# Patient Record
Sex: FEMALE | Race: WHITE | NOT HISPANIC OR LATINO | Employment: FULL TIME | URBAN - NONMETROPOLITAN AREA
[De-identification: names, ages, dates, MRNs, and addresses within clinical notes are randomized per-mention and may not be internally consistent; named-entity substitution may affect disease eponyms.]

---

## 2017-09-15 ENCOUNTER — TELEPHONE (OUTPATIENT)
Dept: SURGERY | Facility: CLINIC | Age: 22
End: 2017-09-15

## 2017-09-15 NOTE — TELEPHONE ENCOUNTER
Patient no showed for her appointment . No phone # listed to contact patient. Called PCP office spoke with Deepa told her patient no showed for appointment and asks for another phone # to contact patient the only one she had was the same number we had called and when called whoever answered said he didn't know a Janae. I mailed a no show letter to patient.

## 2017-10-03 ENCOUNTER — OFFICE VISIT (OUTPATIENT)
Dept: SURGERY | Facility: CLINIC | Age: 22
End: 2017-10-03

## 2017-10-03 VITALS
HEIGHT: 60 IN | TEMPERATURE: 98.8 F | SYSTOLIC BLOOD PRESSURE: 108 MMHG | OXYGEN SATURATION: 97 % | BODY MASS INDEX: 28.62 KG/M2 | HEART RATE: 97 BPM | DIASTOLIC BLOOD PRESSURE: 68 MMHG | WEIGHT: 145.8 LBS

## 2017-10-03 DIAGNOSIS — K62.5 RECTAL BLEED: Primary | ICD-10-CM

## 2017-10-03 PROCEDURE — 99204 OFFICE O/P NEW MOD 45 MIN: CPT | Performed by: SURGERY

## 2017-10-03 RX ORDER — AMITRIPTYLINE HYDROCHLORIDE 25 MG/1
25 TABLET, FILM COATED ORAL NIGHTLY
COMMUNITY
End: 2017-11-07

## 2017-10-03 RX ORDER — FLAVOXATE HYDROCHLORIDE 100 MG/1
100 TABLET ORAL 3 TIMES DAILY PRN
COMMUNITY
End: 2017-11-07

## 2017-10-03 NOTE — PROGRESS NOTES
Patient: Janae Padilla    YOB: 1995    Date: 10/03/2017    Primary Care Provider: Tunde Valles MD    Chief complaint:   Chief Complaint   Patient presents with   • Rectal Bleeding       Subjective .     History of present illness:  I saw the patient in office today for rectal bleeding that she says has been going on for two years but has gotten worse in the past few months. She states it started out as spotting but is now more. She had a colonoscopy done in 2015 at [facility] that showed diverticulosis and three hemorrhoids. She complains of having diarrhea in the past couple of daysBut usually is more constipated. She has been having cramp like lower abdominal pain that does not radiate.  The bleeding is usually with wiping but sometimes on the surface of the stool.  She denies any nausea or vomiting.  Patient also admits to some mucousy stools.  No weight loss.    Review of Systems   Constitutional: Negative for chills, fever and unexpected weight change.   HENT: Negative for hearing loss, trouble swallowing and voice change.    Eyes: Negative for visual disturbance.   Respiratory: Negative for apnea, cough, chest tightness, shortness of breath and wheezing.    Cardiovascular: Negative for chest pain, palpitations and leg swelling.   Gastrointestinal: Positive for abdominal pain, anal bleeding and diarrhea. Negative for abdominal distention, blood in stool, constipation, nausea, rectal pain and vomiting.   Endocrine: Negative for cold intolerance and heat intolerance.   Genitourinary: Negative for difficulty urinating, dysuria and flank pain.   Musculoskeletal: Negative for back pain and gait problem.   Skin: Negative for color change, rash and wound.   Neurological: Negative for dizziness, syncope, speech difficulty, weakness, light-headedness, numbness and headaches.   Hematological: Negative for adenopathy. Does not bruise/bleed easily.   Psychiatric/Behavioral: Negative for  "confusion. The patient is not nervous/anxious.        Allergies:  Allergies   Allergen Reactions   • Bactrim [Sulfamethoxazole-Trimethoprim]        Medications:    Current Outpatient Prescriptions:   •  amitriptyline (ELAVIL) 25 MG tablet, Take 25 mg by mouth Every Night., Disp: , Rfl:   •  flavoxATE (URISPAS) 100 MG tablet, Take 100 mg by mouth 3 (Three) Times a Day As Needed for bladder spasms., Disp: , Rfl:     History\"  History reviewed. No pertinent past medical history.    Past Surgical History:   Procedure Laterality Date   • BILATERAL BREAST REDUCTION      2011   • COLONOSCOPY      2015   • WISDOM TOOTH EXTRACTION      2014       Family History   Problem Relation Age of Onset   • No Known Problems Mother    • No Known Problems Father        Social History   Substance Use Topics   • Smoking status: Never Smoker   • Smokeless tobacco: Never Used   • Alcohol use No        Objective     Vital Signs:   /68  Pulse 97  Temp 98.8 °F (37.1 °C) (Temporal Artery )   Ht 60\" (152.4 cm)  Wt 145 lb 12.8 oz (66.1 kg)  SpO2 97%  BMI 28.47 kg/m2    Physical Exam:   General Appearance:    Alert, cooperative, in no acute distress   Head:    Normocephalic, without obvious abnormality, atraumatic   Eyes:            Lids and lashes normal, conjunctivae and sclerae normal, no   icterus, no pallor, corneas clear, PERRLA   Ears:    Ears appear intact with no abnormalities noted   Lungs:     Clear to auscultation,respirations regular, even and                  unlabored    Heart:    Regular rhythm and normal rate, normal S1 and S2, no            murmur, no gallop, no rub, no click   Chest Wall:    No abnormalities observed   Abdomen:     Normal bowel sounds, no masses, no organomegaly, soft        non-tender, non-distended, no guarding, no rebound                tenderness   Extremities:   Moves all extremities well, no edema, no cyanosis, no             redness   Skin:   No bleeding, bruising or rash   Neurologic:   " Cranial nerves 2 - 12 grossly intact, sensation intact,    Anal exam: Digital exam and anoscopy were unremarkable.  No significant internal hemorrhoids were noted.  No fissure or other abnormality seen.       Results Review:   I reviewed the patient's new clinical results.  I retrieved the old colonoscopy report from [place] and reviewed this    Assessment/Plan     1. Rectal bleed        Still most likely hemorrhoidal bleeding given negative colonoscopy other than for an isolated diverticula at that time.  I would like to treat her relative constipation with fiber daily such as Metamucil.  Local hygiene was also explained.  Bowel habits including avoiding sitting for prolonged period of time and straining etc.  Diet and exercise as well as plenty of liquids were explained as well.  I will see her in a month to see how she is progressing.  If she continues to have issues we may proceed with a colonoscopy but this will likely be of low yield given a normal colonoscopy otherwise to years ago and no weight loss or significant diarrhea etc.    Review of Systems was reviewed and confirmed as accurate today.    Electronically signed by Amado Garcia MD  10/03/17      Scribed for Amado Garcia MD by Kizzy Zepeda. 10/3/2017  10:02 AM

## 2017-11-07 ENCOUNTER — OFFICE VISIT (OUTPATIENT)
Dept: SURGERY | Facility: CLINIC | Age: 22
End: 2017-11-07

## 2017-11-07 VITALS
WEIGHT: 150 LBS | SYSTOLIC BLOOD PRESSURE: 102 MMHG | DIASTOLIC BLOOD PRESSURE: 66 MMHG | RESPIRATION RATE: 16 BRPM | TEMPERATURE: 98.4 F | HEIGHT: 60 IN | HEART RATE: 86 BPM | OXYGEN SATURATION: 100 % | BODY MASS INDEX: 29.45 KG/M2

## 2017-11-07 DIAGNOSIS — K64.0 GRADE I HEMORRHOIDS: Primary | ICD-10-CM

## 2017-11-07 PROCEDURE — 99213 OFFICE O/P EST LOW 20 MIN: CPT | Performed by: SURGERY

## 2017-11-07 RX ORDER — ETONOGESTREL AND ETHINYL ESTRADIOL 11.7; 2.7 MG/1; MG/1
1 INSERT, EXTENDED RELEASE VAGINAL
COMMUNITY
End: 2017-11-07

## 2017-11-07 NOTE — PROGRESS NOTES
"Patient: Janae Padilla    YOB: 1995    Date: 11/07/2017    Primary Care Provider: Tunde Valles MD    Chief Complaint:   Chief Complaint   Patient presents with   • Follow-up     rectal bleeding.       History: Pt is here for follow-up rectal bleeding, she stated that she is not having rectal bleeding at this time.Denies any diarrhea.  Pt stated that she had one episode of rectal pain which only lasted for a brief time, she is not having any rectal pain at this time  She did have a colonoscopy performed previously @ 2015 which was done in [place], [place], it showed diverticulosis and hemorrhoids.     Review of Systems   Constitutional: Negative for chills, fever and unexpected weight change.   Respiratory: Negative for apnea, cough, chest tightness, shortness of breath and wheezing.    Cardiovascular: Negative for chest pain, palpitations and leg swelling.   Gastrointestinal: Negative for abdominal distention, abdominal pain, anal bleeding, blood in stool, constipation, diarrhea, nausea, rectal pain and vomiting.   Genitourinary: Negative for difficulty urinating, dysuria and flank pain.   Skin: Negative for color change, rash and wound.       Vital Signs  /66  Pulse 86  Temp 98.4 °F (36.9 °C) (Temporal Artery )   Resp 16  Ht 60\" (152.4 cm)  Wt 150 lb (68 kg)  SpO2 100%  BMI 29.29 kg/m2    Allergies:  Allergies   Allergen Reactions   • Bactrim [Sulfamethoxazole-Trimethoprim]        Medications:    Current Outpatient Prescriptions:   •  amitriptyline (ELAVIL) 25 MG tablet, Take 25 mg by mouth Every Night., Disp: , Rfl:   •  etonogestrel-ethinyl estradiol (NUVARING) 0.12-0.015 MG/24HR vaginal ring, Insert 1 each into the vagina Every 28 (Twenty-Eight) Days. Insert vaginally and leave in place for 3 consecutive weeks, then remove for 1 week., Disp: , Rfl:   •  flavoxATE (URISPAS) 100 MG tablet, Take 100 mg by mouth 3 (Three) Times a Day As Needed for bladder spasms., Disp: , Rfl: "     Physical Exam:   General Appearance:    Alert, cooperative, in no acute distress    Heart:    Regular rhythm and normal rate,   Abdomen:     Normal bowel sounds, no masses, no organomegaly, soft        non-tender, non-distended, no guarding, no rebound                tenderness      Assessment/Plan     1. Grade I hemorrhoids :Bleeding likely secondary to hemorrhoids.  She has changed her diet and is more high-fiber nature.  This has improved her symptoms and she was performing a lot of wiping with her bowel movements before changing her diet which likely aggravated any of her symptoms.  If she has further bleeding she should follow-up with me otherwise continue high-fiber diet.         Electronically signed by Amado Garcia MD  11/07/17    Scribed for Amado Garcia MD by Tabatha Biswas. 11/7/2017  10:35 AM

## 2017-11-08 ENCOUNTER — OFFICE VISIT (OUTPATIENT)
Dept: UROLOGY | Facility: CLINIC | Age: 22
End: 2017-11-08

## 2017-11-08 VITALS
BODY MASS INDEX: 29.45 KG/M2 | OXYGEN SATURATION: 95 % | HEART RATE: 74 BPM | HEIGHT: 60 IN | TEMPERATURE: 95 F | WEIGHT: 150 LBS

## 2017-11-08 DIAGNOSIS — N30.10 IC (INTERSTITIAL CYSTITIS): Primary | ICD-10-CM

## 2017-11-08 LAB
BILIRUB BLD-MCNC: NEGATIVE MG/DL
CLARITY, POC: CLEAR
COLOR UR: YELLOW
GLUCOSE UR STRIP-MCNC: NEGATIVE MG/DL
KETONES UR QL: NEGATIVE
LEUKOCYTE EST, POC: NEGATIVE
NITRITE UR-MCNC: NEGATIVE MG/ML
PH UR: 5.5 [PH] (ref 5–8)
PROT UR STRIP-MCNC: NEGATIVE MG/DL
RBC # UR STRIP: ABNORMAL /UL
SP GR UR: 1.03 (ref 1–1.03)
UROBILINOGEN UR QL: NORMAL

## 2017-11-08 PROCEDURE — 52000 CYSTOURETHROSCOPY: CPT | Performed by: UROLOGY

## 2017-11-08 PROCEDURE — 99203 OFFICE O/P NEW LOW 30 MIN: CPT | Performed by: UROLOGY

## 2017-11-08 PROCEDURE — 81003 URINALYSIS AUTO W/O SCOPE: CPT | Performed by: UROLOGY

## 2017-11-08 PROCEDURE — 51700 IRRIGATION OF BLADDER: CPT | Performed by: UROLOGY

## 2017-11-08 RX ORDER — AMITRIPTYLINE HYDROCHLORIDE 25 MG/1
25 TABLET, FILM COATED ORAL NIGHTLY
Qty: 30 TABLET | Refills: 11 | Status: SHIPPED | OUTPATIENT
Start: 2017-11-08 | End: 2019-01-03 | Stop reason: SDUPTHER

## 2017-11-08 RX ORDER — AMITRIPTYLINE HYDROCHLORIDE 25 MG/1
25 TABLET, FILM COATED ORAL NIGHTLY
COMMUNITY
End: 2017-11-08 | Stop reason: SDUPTHER

## 2017-11-08 NOTE — PROGRESS NOTES
Chief Complaint  Consult (REFERRED BY [facility] FOR IC EVALUATION )      LUANA  Janae Padilla is a 22 y.o.female who presents today requesting evaluation for possible interstitial cystitis.  She has dramatic symptoms of overactive bladder pelvic pain and nocturia and her on has IC and told her it sounded familiar.  Ironically she was seen at  because of a strong family history of vesicoureteral reflux.  She was told she didn't have that but they did not check her for IC.  She has derived significant benefit of her nocturia from taking nightly Elavil.  She states a gynecologist performed a potassium stimulation test that was positive.  She's never had evaluation by urology with cystoscopy.    Vitals:    11/08/17 1542   Pulse: 74   Temp: 95 °F (35 °C)   SpO2: 95%       Past Medical History  Past Medical History:   Diagnosis Date   • Diverticulitis    • Hepatitis    • UTI (urinary tract infection)        Past Surgical History  Past Surgical History:   Procedure Laterality Date   • BILATERAL BREAST REDUCTION     • COLONOSCOPY         Medications  has a current medication list which includes the following prescription(s): amitriptyline.    Allergies  Allergies   Allergen Reactions   • Sulfa Antibiotics        Social History  Social History     Social History   • Marital status: Single     Spouse name: N/A   • Number of children: N/A   • Years of education: N/A     Occupational History   • Not on file.     Social History Main Topics   • Smoking status: Former Smoker   • Smokeless tobacco: Never Used   • Alcohol use No   • Drug use: No   • Sexual activity: Defer     Other Topics Concern   • Not on file     Social History Narrative   • No narrative on file       Family History  No family history on file.    Review of Systems  Review of Systems  Positive for chills feeling tired abdominal pain frequency vaginal discharge painful menstruation but negative and other categories.  Physical Exam  Physical Exam    Constitutional: She is oriented to person, place, and time. She appears well-developed and well-nourished.   HENT:   Head: Normocephalic.   Eyes: EOM are normal. Pupils are equal, round, and reactive to light.   Neck: Normal range of motion. Neck supple.   Cardiovascular: Normal rate and regular rhythm.    Pulmonary/Chest: Effort normal.   Abdominal: Soft. Bowel sounds are normal.   Genitourinary: Vagina normal and uterus normal.   Musculoskeletal: Normal range of motion. She exhibits no edema.   Neurological: She is alert and oriented to person, place, and time.   Skin: Skin is warm and dry.   Psychiatric: She has a normal mood and affect. Her behavior is normal.       Labs recent and today in the office:  Results for orders placed or performed in visit on 11/08/17   POC Urinalysis Dipstick, Automated   Result Value Ref Range    Color Yellow Yellow, Straw, Dark Yellow, Henna    Clarity, UA Clear Clear    Glucose, UA Negative Negative, 1000 mg/dL (3+) mg/dL    Bilirubin Negative Negative    Ketones, UA Negative Negative    Specific Gravity  1.030 1.005 - 1.030    Blood, UA Trace (A) Negative    pH, Urine 5.5 5.0 - 8.0    Protein, POC Negative Negative mg/dL    Urobilinogen, UA Normal Normal    Leukocytes Negative Negative    Nitrite, UA Negative Negative      Female cystoscopy:     The patient is prepped and draped in the dorsal lithotomy position in a routine sterile fashion. The vulva and urethral meatus are inspected and found to be normal. The panendoscope is inserted in the bladder which is visualized with the Foroblique and 70 degree lenses and found to be free of foreign bodies and mucosal lesions. Ureteral orifices are normal location and effluxing clear urine bilaterally.  She has a 1 ounce postvoid residual.  The bladder is distended only 300 mL which produces dramatic discomfort and on reinspection there are profound and dramatic changes of chronic interstitial cystitis.  She is given and installation  of rescue solution followed by DMSO.    Assessment & Plan  Placed on Toviaz Elavil is renewed and she will return in 1 month for follow-up.

## 2017-11-09 DIAGNOSIS — N30.10 IC (INTERSTITIAL CYSTITIS): Primary | ICD-10-CM

## 2017-11-09 RX ORDER — TRAMADOL HYDROCHLORIDE 50 MG/1
50 TABLET ORAL EVERY 6 HOURS PRN
Qty: 10 TABLET | Refills: 0 | Status: SHIPPED | OUTPATIENT
Start: 2017-11-09 | End: 2018-10-16

## 2017-12-14 ENCOUNTER — OFFICE VISIT (OUTPATIENT)
Dept: OBSTETRICS AND GYNECOLOGY | Facility: CLINIC | Age: 22
End: 2017-12-14

## 2017-12-14 ENCOUNTER — OFFICE VISIT (OUTPATIENT)
Dept: UROLOGY | Facility: CLINIC | Age: 22
End: 2017-12-14

## 2017-12-14 VITALS — BODY MASS INDEX: 29.06 KG/M2 | HEART RATE: 72 BPM | OXYGEN SATURATION: 98 % | WEIGHT: 148 LBS | HEIGHT: 60 IN

## 2017-12-14 VITALS
SYSTOLIC BLOOD PRESSURE: 110 MMHG | WEIGHT: 148 LBS | BODY MASS INDEX: 29.06 KG/M2 | HEIGHT: 60 IN | DIASTOLIC BLOOD PRESSURE: 62 MMHG

## 2017-12-14 DIAGNOSIS — N76.0 ACUTE VAGINITIS: Primary | ICD-10-CM

## 2017-12-14 DIAGNOSIS — N30.10 IC (INTERSTITIAL CYSTITIS): Primary | ICD-10-CM

## 2017-12-14 DIAGNOSIS — N89.8 VAGINAL DISCHARGE: ICD-10-CM

## 2017-12-14 LAB
BILIRUB BLD-MCNC: NEGATIVE MG/DL
CLARITY, POC: CLEAR
COLOR UR: YELLOW
GLUCOSE UR STRIP-MCNC: NEGATIVE MG/DL
KETONES UR QL: NEGATIVE
LEUKOCYTE EST, POC: NEGATIVE
NITRITE UR-MCNC: NEGATIVE MG/ML
PH UR: 7.5 [PH] (ref 5–8)
PROT UR STRIP-MCNC: NEGATIVE MG/DL
RBC # UR STRIP: ABNORMAL /UL
SP GR UR: 1.02 (ref 1–1.03)
UROBILINOGEN UR QL: NORMAL

## 2017-12-14 PROCEDURE — 99213 OFFICE O/P EST LOW 20 MIN: CPT | Performed by: UROLOGY

## 2017-12-14 PROCEDURE — 81003 URINALYSIS AUTO W/O SCOPE: CPT | Performed by: UROLOGY

## 2017-12-14 PROCEDURE — 99214 OFFICE O/P EST MOD 30 MIN: CPT | Performed by: PHYSICIAN ASSISTANT

## 2017-12-14 RX ORDER — FLUCONAZOLE 150 MG/1
TABLET ORAL
Qty: 2 TABLET | Refills: 0 | Status: SHIPPED | OUTPATIENT
Start: 2017-12-14 | End: 2018-05-10

## 2017-12-14 NOTE — PROGRESS NOTES
Chief Complaint  IC (INTERSTITIAL CYSTITIS) (PATIENT IS HERE FOR A 1 MONTH FOLLOW UP)      LUANA Padilla is a 22 y.o.female who was recently seen and underwent hydraulic dilatation with cystoscopy and was found have chronic interstitial cystitis.  Started on Elavil and Toviaz she returns today complaining of dry mouth but otherwise improved.  Her voided urine today is clear.    Vitals:    12/14/17 1458   Pulse: 72   SpO2: 98%       Past Medical History  Past Medical History:   Diagnosis Date   • Diverticulitis    • Hepatitis    • Intestinal disease    • UTI (urinary tract infection)        Past Surgical History  Past Surgical History:   Procedure Laterality Date   • BILATERAL BREAST REDUCTION     • COLONOSCOPY     • WISDOM TOOTH EXTRACTION  2014       Medications  has a current medication list which includes the following prescription(s): amitriptyline, fluconazole, and tramadol.    Allergies  Allergies   Allergen Reactions   • Bactrim [Sulfamethoxazole-Trimethoprim]    • Sulfa Antibiotics        Social History  Social History     Social History   • Marital status: Single     Spouse name: N/A   • Number of children: N/A   • Years of education: N/A     Occupational History   • Not on file.     Social History Main Topics   • Smoking status: Former Smoker   • Smokeless tobacco: Never Used   • Alcohol use No   • Drug use: Yes     Special: Marijuana   • Sexual activity: Yes     Birth control/ protection: Inserts      Comment: Nuva Ring     Other Topics Concern   • Not on file     Social History Narrative       Family History  History reviewed. No pertinent family history.    Review of Systems  Review of Systems    Physical Exam  Physical Exam   Constitutional: She is oriented to person, place, and time. She appears well-developed and well-nourished.   HENT:   Head: Normocephalic.   Eyes: EOM are normal. Pupils are equal, round, and reactive to light.   Neck: Normal range of motion. Neck supple.   Cardiovascular:  Normal rate, regular rhythm and normal heart sounds.    Pulmonary/Chest: Effort normal.   Abdominal: Soft. Bowel sounds are normal.   Musculoskeletal: Normal range of motion.   Neurological: She is alert and oriented to person, place, and time.   Skin: Skin is warm and dry.   Psychiatric: She has a normal mood and affect.       Labs recent and today in the office:  Results for orders placed or performed in visit on 12/14/17   POC Urinalysis Dipstick, Automated   Result Value Ref Range    Color Yellow Yellow, Straw, Dark Yellow, Henna    Clarity, UA Clear Clear    Glucose, UA Negative Negative, 1000 mg/dL (3+) mg/dL    Bilirubin Negative Negative    Ketones, UA Negative Negative    Specific Gravity  1.020 1.005 - 1.030    Blood, UA 2+ (A) Negative    pH, Urine 7.5 5.0 - 8.0    Protein, POC Negative Negative mg/dL    Urobilinogen, UA Normal Normal    Leukocytes Negative Negative    Nitrite, UA Negative Negative         Assessment & Plan  Chronic interstitial cystitis: She is encouraged bring by specimen anytime she thinks she has an infection and consider additional treatments with DMSO.  I suggested Myrbetriq since she is having a hard time tolerating the Toviaz.  She'll return to clinic for follow-up.

## 2017-12-14 NOTE — PROGRESS NOTES
Subjective   Chief Complaint   Patient presents with   • Vaginal Discharge     Pt states that she has a recurrent problem with Vaginal Discharge (2+ years) Discharge white, thick , odor.  No itching or burning.        Janae Padilla is a 22 y.o. year old  presenting to be seen for complaint of vaginal discharge and itching  Patient reports whitish vaginal discharge which she has had for a few years but noted itching and some odor for 2-3 weeks  She has not been sexually active for 2 months and did not use condoms consistently  Has been using Nuva ring for 3 months-was on DMPA before Nuva ring  She has interstitial cystitis followed by Dr Kennedy           Past Medical History:   Diagnosis Date   • Diverticulitis    • Hepatitis    • Intestinal disease    • UTI (urinary tract infection)         Current Outpatient Prescriptions:   •  amitriptyline (ELAVIL) 25 MG tablet, Take 1 tablet by mouth Every Night., Disp: 30 tablet, Rfl: 11  •  fluconazole (DIFLUCAN) 150 MG tablet, One po now and repeat in 3 days, Disp: 2 tablet, Rfl: 0  •  traMADol (ULTRAM) 50 MG tablet, Take 1 tablet by mouth Every 6 (Six) Hours As Needed for Severe Pain ., Disp: 10 tablet, Rfl: 0   Allergies   Allergen Reactions   • Bactrim [Sulfamethoxazole-Trimethoprim]    • Sulfa Antibiotics       Past Surgical History:   Procedure Laterality Date   • BILATERAL BREAST REDUCTION     • COLONOSCOPY     • WISDOM TOOTH EXTRACTION        Social History     Social History   • Marital status: Single     Spouse name: N/A   • Number of children: N/A   • Years of education: N/A     Occupational History   • Not on file.     Social History Main Topics   • Smoking status: Former Smoker   • Smokeless tobacco: Never Used   • Alcohol use No   • Drug use: Yes     Special: Marijuana   • Sexual activity: Yes     Birth control/ protection: Inserts      Comment: Nuva Ring     Other Topics Concern   • Not on file     Social History Narrative      History reviewed. No  "pertinent family history.    Review of Systems   Constitutional: Negative.    Gastrointestinal: Negative.    Endocrine: Positive for heat intolerance and polydipsia.   Genitourinary: Positive for difficulty urinating, enuresis, frequency and urgency.           Objective   /62  Ht 152.4 cm (60\")  Wt 67.1 kg (148 lb)  LMP 04/01/2017  BMI 28.9 kg/m2    Physical Exam   Constitutional: She appears well-developed and well-nourished. She is cooperative.   Abdominal: Soft. Normal appearance. There is no hepatosplenomegaly. There is no tenderness. There is no rigidity and no guarding.   Genitourinary: Uterus normal. There is no tenderness or lesion on the right labia. There is no tenderness or lesion on the left labia. Cervix exhibits discharge. Cervix exhibits no motion tenderness and no friability. Right adnexum displays no mass and no tenderness. Left adnexum displays no mass and no tenderness. There is erythema in the vagina. Vaginal discharge found.   Neurological: She is alert.   Skin: Skin is warm and dry.   Psychiatric: She has a normal mood and affect. Her behavior is normal.            Assessment and Plan  Janae was seen today for vaginal discharge.    Diagnoses and all orders for this visit:    Acute vaginitis  -     NuSwab VG+ - Swab, Vagina    Vaginal discharge    Other orders  -     fluconazole (DIFLUCAN) 150 MG tablet; One po now and repeat in 3 days      Patient Instructions   Clinically appears yeast so will treat with diflucan  Vaginal swab done-call with results             This note was electronically signed.    Ramona Hernandez PA-C   December 14, 2017  "

## 2017-12-20 LAB
A VAGINAE DNA VAG QL NAA+PROBE: ABNORMAL SCORE
BVAB2 DNA VAG QL NAA+PROBE: ABNORMAL SCORE
C ALBICANS DNA VAG QL NAA+PROBE: NEGATIVE
C GLABRATA DNA VAG QL NAA+PROBE: NEGATIVE
C TRACH RRNA SPEC QL NAA+PROBE: NEGATIVE
MEGA1 DNA VAG QL NAA+PROBE: ABNORMAL SCORE
N GONORRHOEA RRNA SPEC QL NAA+PROBE: NEGATIVE
T VAGINALIS RRNA SPEC QL NAA+PROBE: NEGATIVE

## 2017-12-22 ENCOUNTER — TELEPHONE (OUTPATIENT)
Dept: OBSTETRICS AND GYNECOLOGY | Facility: CLINIC | Age: 22
End: 2017-12-22

## 2017-12-22 RX ORDER — METRONIDAZOLE 7.5 MG/G
GEL VAGINAL
Qty: 70 G | Refills: 0 | Status: SHIPPED | OUTPATIENT
Start: 2017-12-22 | End: 2018-05-10

## 2017-12-22 NOTE — TELEPHONE ENCOUNTER
----- Message from Ramona Hernandez PA-C sent at 12/21/2017  8:16 AM EST -----  Please inform swab positive for BV and send in metrogel vaginal one applicator at  for 7 nights-thanks

## 2018-01-24 ENCOUNTER — TELEPHONE (OUTPATIENT)
Dept: UROLOGY | Facility: CLINIC | Age: 23
End: 2018-01-24

## 2018-01-24 ENCOUNTER — LAB (OUTPATIENT)
Dept: UROLOGY | Facility: CLINIC | Age: 23
End: 2018-01-24

## 2018-01-24 DIAGNOSIS — N30.10 CYSTITIS, INTERSTITIAL: ICD-10-CM

## 2018-01-24 DIAGNOSIS — R30.0 DYSURIA: Primary | ICD-10-CM

## 2018-01-24 LAB
BILIRUB BLD-MCNC: NEGATIVE MG/DL
CLARITY, POC: CLEAR
COLOR UR: YELLOW
GLUCOSE UR STRIP-MCNC: NEGATIVE MG/DL
KETONES UR QL: NEGATIVE
LEUKOCYTE EST, POC: ABNORMAL
NITRITE UR-MCNC: POSITIVE MG/ML
PH UR: 5.5 [PH] (ref 5–8)
PROT UR STRIP-MCNC: NEGATIVE MG/DL
RBC # UR STRIP: ABNORMAL /UL
SP GR UR: 1.03 (ref 1–1.03)
UROBILINOGEN UR QL: NORMAL

## 2018-01-24 PROCEDURE — 81003 URINALYSIS AUTO W/O SCOPE: CPT | Performed by: UROLOGY

## 2018-01-24 NOTE — TELEPHONE ENCOUNTER
Patient came in for a urine, Patient states she cannot tell if it is a UTI or her IC. Urine test was very positive for infection. Culture is sent, Patient states she would like a different antibiotic than the one prescribed last time she had a UTI, stating it really didn't seem to help. Patient uses [facility] in [place].  UA was positive for blood, leukocytes, and nitrites.

## 2018-01-24 NOTE — TELEPHONE ENCOUNTER
Patient states she would like to have an antibiotic that does not cause a yeast infection, she is allergic to sulfa.

## 2018-01-24 NOTE — TELEPHONE ENCOUNTER
I called patient to let her know that I spoke with  and we are waiting for her urine culture results in order to best treat her with antibiotics that will work well.

## 2018-01-26 LAB
BACTERIA UR CULT: NO GROWTH
BACTERIA UR CULT: NORMAL

## 2018-02-21 ENCOUNTER — PROCEDURE VISIT (OUTPATIENT)
Dept: UROLOGY | Facility: CLINIC | Age: 23
End: 2018-02-21

## 2018-02-21 DIAGNOSIS — N30.10 CYSTITIS, INTERSTITIAL: Primary | ICD-10-CM

## 2018-02-21 LAB
BILIRUB BLD-MCNC: NEGATIVE MG/DL
CLARITY, POC: CLEAR
COLOR UR: YELLOW
GLUCOSE UR STRIP-MCNC: NEGATIVE MG/DL
KETONES UR QL: NEGATIVE
LEUKOCYTE EST, POC: NEGATIVE
NITRITE UR-MCNC: NEGATIVE MG/ML
PH UR: 6 [PH] (ref 5–8)
PROT UR STRIP-MCNC: NEGATIVE MG/DL
RBC # UR STRIP: ABNORMAL /UL
SP GR UR: 1.02 (ref 1–1.03)
UROBILINOGEN UR QL: NORMAL

## 2018-02-21 PROCEDURE — 81003 URINALYSIS AUTO W/O SCOPE: CPT | Performed by: UROLOGY

## 2018-02-21 PROCEDURE — 51700 IRRIGATION OF BLADDER: CPT | Performed by: UROLOGY

## 2018-02-21 NOTE — PROGRESS NOTES
Patient came in for Rimso treatment only, patient tolerated the procedure well and was covered with a cipro.  Cipro 500mg   Lot [id number]  Exp 08/19  ndc [id number]

## 2018-03-29 ENCOUNTER — PROCEDURE VISIT (OUTPATIENT)
Dept: UROLOGY | Facility: CLINIC | Age: 23
End: 2018-03-29

## 2018-03-29 VITALS
HEART RATE: 66 BPM | DIASTOLIC BLOOD PRESSURE: 69 MMHG | SYSTOLIC BLOOD PRESSURE: 138 MMHG | TEMPERATURE: 98.3 F | OXYGEN SATURATION: 99 % | RESPIRATION RATE: 18 BRPM

## 2018-03-29 DIAGNOSIS — N30.40 IRRADIATION CYSTITIS: Primary | ICD-10-CM

## 2018-03-29 DIAGNOSIS — N30.10 CYSTITIS, INTERSTITIAL: ICD-10-CM

## 2018-03-29 LAB
BILIRUB BLD-MCNC: NEGATIVE MG/DL
CLARITY, POC: CLEAR
COLOR UR: YELLOW
GLUCOSE UR STRIP-MCNC: NEGATIVE MG/DL
KETONES UR QL: NEGATIVE
LEUKOCYTE EST, POC: NEGATIVE
NITRITE UR-MCNC: NEGATIVE MG/ML
PH UR: 6 [PH] (ref 5–8)
PROT UR STRIP-MCNC: NEGATIVE MG/DL
RBC # UR STRIP: ABNORMAL /UL
SP GR UR: 1.03 (ref 1–1.03)
UROBILINOGEN UR QL: NORMAL

## 2018-03-29 PROCEDURE — 81003 URINALYSIS AUTO W/O SCOPE: CPT | Performed by: UROLOGY

## 2018-03-29 PROCEDURE — 51700 IRRIGATION OF BLADDER: CPT | Performed by: UROLOGY

## 2018-05-10 ENCOUNTER — OFFICE VISIT (OUTPATIENT)
Dept: OBSTETRICS AND GYNECOLOGY | Facility: CLINIC | Age: 23
End: 2018-05-10

## 2018-05-10 VITALS
DIASTOLIC BLOOD PRESSURE: 76 MMHG | BODY MASS INDEX: 31.61 KG/M2 | HEIGHT: 60 IN | SYSTOLIC BLOOD PRESSURE: 118 MMHG | WEIGHT: 161 LBS

## 2018-05-10 DIAGNOSIS — N92.6 IRREGULAR MENSES: Primary | ICD-10-CM

## 2018-05-10 DIAGNOSIS — R10.2 PELVIC PAIN: ICD-10-CM

## 2018-05-10 DIAGNOSIS — N94.6 DYSMENORRHEA: ICD-10-CM

## 2018-05-10 PROCEDURE — 99213 OFFICE O/P EST LOW 20 MIN: CPT | Performed by: PHYSICIAN ASSISTANT

## 2018-05-10 NOTE — PROGRESS NOTES
"Subjective   Chief Complaint   Patient presents with   • Contraception     Currently uses Nuvaring and is having heavy periods with it. She would like to discuss other birth control options       Janae Padilla is a 22 y.o. year old  presenting to be seen for discussion of birth control options.  Patient has been using Nuva ring for 1 year prescribed by her previous GYN Ramona Banda in [place], [place]. Patient states was told by her gynecologist she could use nuva ring continually to avoid menses.  Patient states she has been leaving Nuva ring in for 4 weeks at a time then inserts new ring.  She reports she has been having irregular bleeding which has been heavy at times for 2-3 months and also \"horrible\" lower pelvic cramping mostly RLQ which has been intermittant for 2 months.   She has history of interstitial cystitis and currently seeing Dr Kennedy  She reports has had IUD in past and IUD \"moved\" and had to be removed.        Past Medical History:   Diagnosis Date   • Diverticulitis    • Hepatitis    • Intestinal disease    • UTI (urinary tract infection)         Current Outpatient Prescriptions:   •  amitriptyline (ELAVIL) 25 MG tablet, Take 1 tablet by mouth Every Night., Disp: 30 tablet, Rfl: 11  •  Mirabegron ER (MYRBETRIQ) 50 MG tablet sustained-release 24 hour 24 hr tablet, Take 50 mg by mouth Daily., Disp: 30 tablet, Rfl: 11  •  traMADol (ULTRAM) 50 MG tablet, Take 1 tablet by mouth Every 6 (Six) Hours As Needed for Severe Pain ., Disp: 10 tablet, Rfl: 0   Allergies   Allergen Reactions   • Bactrim [Sulfamethoxazole-Trimethoprim] Rash   • Sulfa Antibiotics Rash      Past Surgical History:   Procedure Laterality Date   • BILATERAL BREAST REDUCTION     • COLONOSCOPY     • WISDOM TOOTH EXTRACTION        Social History     Social History   • Marital status: Single     Spouse name: N/A   • Number of children: N/A   • Years of education: N/A     Occupational History   • Not on file.     Social History " "Main Topics   • Smoking status: Former Smoker   • Smokeless tobacco: Never Used   • Alcohol use No   • Drug use:      Types: Marijuana   • Sexual activity: Not Currently     Partners: Male     Birth control/ protection: Inserts      Comment: Nuva Ring     Other Topics Concern   • Not on file     Social History Narrative   • No narrative on file      Family History   Problem Relation Age of Onset   • Diverticulosis Father    • No Known Problems Mother        Review of Systems   Constitutional: Negative.    Gastrointestinal: Negative.    Genitourinary: Positive for menstrual problem, pelvic pain and vaginal bleeding. Negative for difficulty urinating, dysuria and vaginal discharge.           Objective   /76   Ht 152.4 cm (60\")   Wt 73 kg (161 lb)   LMP 04/25/2018 (Exact Date)   Breastfeeding? No   BMI 31.44 kg/m²     Physical Exam         Assessment and Plan  Janae was seen today for contraception.    Diagnoses and all orders for this visit:    Irregular menses  -     US Non-ob Transvaginal    Pelvic pain  -     US Non-ob Transvaginal    Dysmenorrhea  -     US Non-ob Transvaginal      Patient Instructions   Given onset of pelvic pain and RLQ pain and abnormal bleeding recommend patient return for pelvic ultrasound.  Discussed all options for contraception and patient interested in nexplanon but wants to wait until has ultrasound and discuss further  Will continue Nuva ring for now              This note was electronically signed.    Ramona Hernandez PA-C   May 10, 2018  "

## 2018-05-10 NOTE — PATIENT INSTRUCTIONS
Given onset of pelvic pain and RLQ pain and abnormal bleeding recommend patient return for pelvic ultrasound.  Discussed all options for contraception and patient interested in nexplanon but wants to wait until has ultrasound and discuss further  Will continue Nuva ring for now

## 2018-05-24 ENCOUNTER — OFFICE VISIT (OUTPATIENT)
Dept: UROLOGY | Facility: CLINIC | Age: 23
End: 2018-05-24

## 2018-05-24 VITALS — HEIGHT: 60 IN | WEIGHT: 161 LBS | BODY MASS INDEX: 31.61 KG/M2

## 2018-05-24 DIAGNOSIS — N30.10 CYSTITIS, INTERSTITIAL: Primary | ICD-10-CM

## 2018-05-24 DIAGNOSIS — R30.0 DYSURIA: ICD-10-CM

## 2018-05-24 PROCEDURE — 81003 URINALYSIS AUTO W/O SCOPE: CPT | Performed by: UROLOGY

## 2018-05-24 PROCEDURE — 99213 OFFICE O/P EST LOW 20 MIN: CPT | Performed by: UROLOGY

## 2018-05-24 RX ORDER — NITROFURANTOIN 25; 75 MG/1; MG/1
100 CAPSULE ORAL 2 TIMES DAILY
Qty: 14 CAPSULE | Refills: 0 | Status: SHIPPED | OUTPATIENT
Start: 2018-05-24 | End: 2018-06-05

## 2018-05-24 RX ORDER — HEPARIN SODIUM 1000 [USP'U]/ML
1000 INJECTION, SOLUTION INTRAVENOUS; SUBCUTANEOUS ONCE
Status: COMPLETED | OUTPATIENT
Start: 2018-05-24 | End: 2018-05-24

## 2018-05-24 RX ORDER — LIDOCAINE HYDROCHLORIDE 10 MG/ML
10 INJECTION, SOLUTION INFILTRATION; PERINEURAL ONCE
Status: COMPLETED | OUTPATIENT
Start: 2018-05-24 | End: 2018-05-24

## 2018-05-24 RX ADMIN — HEPARIN SODIUM 1000 UNITS: 1000 INJECTION, SOLUTION INTRAVENOUS; SUBCUTANEOUS at 16:00

## 2018-05-24 RX ADMIN — Medication 50 MEQ: at 16:04

## 2018-05-24 RX ADMIN — LIDOCAINE HYDROCHLORIDE 10 ML: 10 INJECTION, SOLUTION INFILTRATION; PERINEURAL at 16:02

## 2018-05-24 NOTE — PROGRESS NOTES
Chief Complaint  Cystitis (IC or UTI, patient dropped off sample yesterday and has had two treatments for IC within the past few months.)      LUANA Padilla is a 22 y.o.female who presents requesting urgent evaluation for pelvic pain who has a history of interstitial cystitis.  She's been taking her Elavil nightly although closer to 10 and 6 PM and she is informed of the explanation for taking an earlier.  Since taking Myrbetrique and getting off caffeine her urgency to void has improved from every 30 minutes to every 3-4 hours.  She's recently had painful intercourse and today presents complaining of bilateral lower abdominal and groin pain.  She came by yesterday to submit a urine sample but that culture is pending.  Today her urine does not appear infected but does have trace positive blood.    There were no vitals filed for this visit.    Past Medical History  Past Medical History:   Diagnosis Date   • Diverticulitis    • Hepatitis    • Intestinal disease    • UTI (urinary tract infection)        Past Surgical History  Past Surgical History:   Procedure Laterality Date   • BILATERAL BREAST REDUCTION     • COLONOSCOPY     • WISDOM TOOTH EXTRACTION  2014       Medications  has a current medication list which includes the following prescription(s): amitriptyline, mirabegron er, and tramadol.    Allergies  Allergies   Allergen Reactions   • Bactrim [Sulfamethoxazole-Trimethoprim] Rash   • Sulfa Antibiotics Rash       Social History  Social History     Social History   • Marital status: Single     Spouse name: N/A   • Number of children: N/A   • Years of education: N/A     Occupational History   • Not on file.     Social History Main Topics   • Smoking status: Former Smoker   • Smokeless tobacco: Never Used   • Alcohol use No   • Drug use: Yes     Types: Marijuana   • Sexual activity: Not Currently     Partners: Male     Birth control/ protection: Inserts      Comment: Nuva Ring     Other Topics Concern   •  Not on file     Social History Narrative   • No narrative on file       Family History  Family History   Problem Relation Age of Onset   • Diverticulosis Father    • No Known Problems Mother        Review of Systems  Review of Systems    Physical Exam  Physical Exam   Constitutional: She is oriented to person, place, and time. She appears well-developed and well-nourished.   HENT:   Head: Normocephalic.   Eyes: EOM are normal. Pupils are equal, round, and reactive to light.   Neck: Normal range of motion. Neck supple.   Cardiovascular: Normal rate and regular rhythm.    Pulmonary/Chest: Effort normal.   Abdominal: Soft. Bowel sounds are normal.   Genitourinary:         Neurological: She is alert and oriented to person, place, and time.   Skin: Skin is warm and dry.   Psychiatric: She has a normal mood and affect.       Labs recent and today in the office:  Results for orders placed or performed in visit on 05/24/18   POC Urinalysis Dipstick, Automated   Result Value Ref Range    Color Yellow Yellow, Straw, Dark Yellow, Henna    Clarity, UA Clear Clear    Glucose, UA Negative Negative, 1000 mg/dL (3+) mg/dL    Bilirubin Negative Negative    Ketones, UA Negative Negative    Specific Gravity  1.030 1.005 - 1.030    Blood, UA 1+ (A) Negative    pH, Urine 6.0 5.0 - 8.0    Protein, POC Negative Negative mg/dL    Urobilinogen, UA Normal Normal    Leukocytes Negative Negative    Nitrite, UA Negative Negative         Assessment & Plan  Chronic interstitial cystitis: I don't think this explains all her pelvic pain as she is not is tender in the bladder as she is the ovary.  She is artery scheduled for a pelvic ultrasound through her GYN.  She is given a rescue solution/DMSO cocktail installation today and covered with 3 days of Macrobid pending the results of her culture.

## 2018-06-05 ENCOUNTER — OFFICE VISIT (OUTPATIENT)
Dept: OBSTETRICS AND GYNECOLOGY | Facility: CLINIC | Age: 23
End: 2018-06-05

## 2018-06-05 VITALS
SYSTOLIC BLOOD PRESSURE: 112 MMHG | BODY MASS INDEX: 31.61 KG/M2 | HEIGHT: 60 IN | WEIGHT: 161 LBS | DIASTOLIC BLOOD PRESSURE: 72 MMHG

## 2018-06-05 DIAGNOSIS — N94.6 DYSMENORRHEA: Primary | ICD-10-CM

## 2018-06-05 PROCEDURE — 99213 OFFICE O/P EST LOW 20 MIN: CPT | Performed by: PHYSICIAN ASSISTANT

## 2018-06-05 NOTE — PATIENT INSTRUCTIONS
Patient counseled on all contraceptive options that could improve menorrhagia and dysmenorrhea. At this time she would like to try a birth control pill and will start cryselle with next cycle. She is advised to take oc's consistently  Follow up 2 months to review or prn

## 2018-06-05 NOTE — PROGRESS NOTES
Subjective   Chief Complaint   Patient presents with   • Follow-up     TVS for pelvic pain       Janae Padilla is a 22 y.o. year old  presenting to be seen for follow up irregular periods and dysmenorrhea  She has had pelvic ultrasound here today for further evaluation of symptoms  Her previous visit is reviewed.   Patient had [place] GYN [place] and was using Nuva ring continuously to try and suppress menses as they are heavy and painful.  She feels that nuva ring was causing some vaginal pain and she removed nuva ring about 2 weeks ago and vaginal pain resolved. She was having irregular bleeding while on nuva ring also  she desires to use some form of birth control to try and help lighten up menses and help dysmenorrhea  Has used IUD previously too and had complications  Her pelvic ultrasound is reviewed with her today-normal uterus, endometrium 5 mm bilateral ovaries with small follicles, no free fluid or adnexal masses      Past Medical History:   Diagnosis Date   • Diverticulitis    • Hepatitis    • Intestinal disease    • UTI (urinary tract infection)         Current Outpatient Prescriptions:   •  amitriptyline (ELAVIL) 25 MG tablet, Take 1 tablet by mouth Every Night., Disp: 30 tablet, Rfl: 11  •  Mirabegron ER (MYRBETRIQ) 50 MG tablet sustained-release 24 hour 24 hr tablet, Take 50 mg by mouth Daily., Disp: 30 tablet, Rfl: 11  •  norgestrel-ethinyl estradiol (CRYSELLE-28) 0.3-30 MG-MCG per tablet, Take 1 tablet by mouth Daily., Disp: 28 tablet, Rfl: 6  •  traMADol (ULTRAM) 50 MG tablet, Take 1 tablet by mouth Every 6 (Six) Hours As Needed for Severe Pain ., Disp: 10 tablet, Rfl: 0  No current facility-administered medications for this visit.    Allergies   Allergen Reactions   • Bactrim [Sulfamethoxazole-Trimethoprim] Rash   • Sulfa Antibiotics Rash      Past Surgical History:   Procedure Laterality Date   • BILATERAL BREAST REDUCTION     • COLONOSCOPY     • WISDOM TOOTH EXTRACTION   "2014      Social History     Social History   • Marital status: Single     Spouse name: N/A   • Number of children: N/A   • Years of education: N/A     Occupational History   • Not on file.     Social History Main Topics   • Smoking status: Former Smoker   • Smokeless tobacco: Never Used   • Alcohol use No   • Drug use: Yes     Types: Marijuana   • Sexual activity: Not Currently     Partners: Male     Birth control/ protection: Inserts      Comment: Nuva Ring     Other Topics Concern   • Not on file     Social History Narrative   • No narrative on file      Family History   Problem Relation Age of Onset   • Diverticulosis Father    • No Known Problems Mother        Review of Systems   Constitutional: Negative.    Gastrointestinal: Negative.    Genitourinary: Positive for menstrual problem and pelvic pain.           Objective   /72   Ht 152.4 cm (60\")   Wt 73 kg (161 lb)   LMP 05/27/2018 (Exact Date)   Breastfeeding? No   BMI 31.44 kg/m²     Physical Exam         Assessment and Plan  Janae was seen today for follow-up.    Diagnoses and all orders for this visit:    Dysmenorrhea    Other orders  -     norgestrel-ethinyl estradiol (CRYSELLE-28) 0.3-30 MG-MCG per tablet; Take 1 tablet by mouth Daily.      Patient Instructions   Patient counseled on all contraceptive options that could improve menorrhagia and dysmenorrhea. At this time she would like to try a birth control pill and will start cryselle with next cycle. She is advised to take oc's consistently  Follow up 2 months to review or prn             This note was electronically signed.    Ramona Hernandez PA-C   June 5, 2018  "

## 2018-06-18 ENCOUNTER — OFFICE VISIT (OUTPATIENT)
Dept: UROLOGY | Facility: CLINIC | Age: 23
End: 2018-06-18

## 2018-06-18 VITALS
WEIGHT: 161 LBS | HEIGHT: 60 IN | OXYGEN SATURATION: 98 % | HEART RATE: 117 BPM | BODY MASS INDEX: 31.61 KG/M2 | SYSTOLIC BLOOD PRESSURE: 118 MMHG | DIASTOLIC BLOOD PRESSURE: 60 MMHG | TEMPERATURE: 97.8 F

## 2018-06-18 DIAGNOSIS — N30.10 CYSTITIS, INTERSTITIAL: Primary | ICD-10-CM

## 2018-06-18 LAB
BILIRUB BLD-MCNC: NEGATIVE MG/DL
CLARITY, POC: CLEAR
COLOR UR: YELLOW
GLUCOSE UR STRIP-MCNC: NEGATIVE MG/DL
KETONES UR QL: NEGATIVE
LEUKOCYTE EST, POC: NEGATIVE
NITRITE UR-MCNC: NEGATIVE MG/ML
PH UR: 5.5 [PH] (ref 5–8)
PROT UR STRIP-MCNC: ABNORMAL MG/DL
RBC # UR STRIP: ABNORMAL /UL
SP GR UR: 1.03 (ref 1–1.03)
UROBILINOGEN UR QL: NORMAL

## 2018-06-18 PROCEDURE — 81003 URINALYSIS AUTO W/O SCOPE: CPT | Performed by: UROLOGY

## 2018-06-18 PROCEDURE — 99213 OFFICE O/P EST LOW 20 MIN: CPT | Performed by: UROLOGY

## 2018-06-18 NOTE — PROGRESS NOTES
Chief Complaint  Cystitis (IC- 6 month follow-up )      HPI  Janae Padilla is a 22 y.o.female who returns today for follow-up of her overactive bladder and pelvic pain known to have chronic interstitial cystitis.  She is symptomatically improved on caffeine reduction and Myrbetriq and amitriptyline but on her last visit seemed quite tender in the left ovary.  Apparently the vaginal ultrasound was normal.  She states since she had her new ring removed she is symptomatically improved.    Vitals:    06/18/18 1334   BP: 118/60   Pulse: 117   Temp: 97.8 °F (36.6 °C)   SpO2: 98%       Past Medical History  Past Medical History:   Diagnosis Date   • Diverticulitis    • Hepatitis    • Intestinal disease    • UTI (urinary tract infection)        Past Surgical History  Past Surgical History:   Procedure Laterality Date   • BILATERAL BREAST REDUCTION     • COLONOSCOPY     • WISDOM TOOTH EXTRACTION  2014       Medications  has a current medication list which includes the following prescription(s): amitriptyline, mirabegron er, norgestrel-ethinyl estradiol, and tramadol, and the following Facility-Administered Medications: dimethyl sulfoxide.    Allergies  Allergies   Allergen Reactions   • Bactrim [Sulfamethoxazole-Trimethoprim] Rash   • Sulfa Antibiotics Rash       Social History  Social History     Social History   • Marital status: Single     Spouse name: N/A   • Number of children: N/A   • Years of education: N/A     Occupational History   • Not on file.     Social History Main Topics   • Smoking status: Former Smoker   • Smokeless tobacco: Never Used   • Alcohol use No   • Drug use: Yes     Types: Marijuana   • Sexual activity: Not Currently     Partners: Male     Birth control/ protection: Inserts      Comment: Nuva Ring     Other Topics Concern   • Not on file     Social History Narrative   • No narrative on file       Family History  Family History   Problem Relation Age of Onset   • Diverticulosis Father    • No  Known Problems Mother        Review of Systems  Review of Systems   Constitutional: Negative.    Genitourinary: Positive for dyspareunia, frequency, pelvic pain and urgency.   All other systems reviewed and are negative.      Physical Exam  Physical Exam    Labs recent and today in the office:  Results for orders placed or performed in visit on 06/18/18   POC Urinalysis Dipstick, Automated   Result Value Ref Range    Color Yellow Yellow, Straw, Dark Yellow, Henna    Clarity, UA Clear Clear    Specific Gravity  1.030 1.005 - 1.030    pH, Urine 5.5 5.0 - 8.0    Leukocytes Negative Negative    Nitrite, UA Negative Negative    Protein, POC Trace (A) Negative mg/dL    Glucose, UA Negative Negative, 1000 mg/dL (3+) mg/dL    Ketones, UA Negative Negative    Urobilinogen, UA Normal Normal    Bilirubin Negative Negative    Blood, UA 3+ (A) Negative         Assessment & Plan  Chronic interstitial cystitis: Her urine today is positive for blood is always but no signs of infection.  This is consistent with her interstitial cystitis and if her symptoms recur she can always call back for a bladder installation.  Otherwise she should continue caffeine reduction and Myrbetriq and amitriptyline.

## 2018-08-06 ENCOUNTER — OFFICE VISIT (OUTPATIENT)
Dept: OBSTETRICS AND GYNECOLOGY | Facility: CLINIC | Age: 23
End: 2018-08-06

## 2018-08-06 VITALS
BODY MASS INDEX: 34.08 KG/M2 | SYSTOLIC BLOOD PRESSURE: 104 MMHG | HEIGHT: 60 IN | WEIGHT: 173.6 LBS | DIASTOLIC BLOOD PRESSURE: 70 MMHG

## 2018-08-06 DIAGNOSIS — Z11.3 SCREENING FOR STD (SEXUALLY TRANSMITTED DISEASE): ICD-10-CM

## 2018-08-06 DIAGNOSIS — Z12.4 SCREENING FOR MALIGNANT NEOPLASM OF CERVIX: ICD-10-CM

## 2018-08-06 DIAGNOSIS — Z01.419 ENCOUNTER FOR GYNECOLOGICAL EXAMINATION WITHOUT ABNORMAL FINDING: Primary | ICD-10-CM

## 2018-08-06 PROCEDURE — 99395 PREV VISIT EST AGE 18-39: CPT | Performed by: PHYSICIAN ASSISTANT

## 2018-08-06 NOTE — PROGRESS NOTES
Subjective   Chief Complaint   Patient presents with   • Gynecologic Exam     Pap is due; doing well on birth control; no complaints       Janae Padilla is a 22 y.o. year old  presenting to be seen for her annual gynecological exam.   She has no complaints or concerns  She reports is doing very well on cryselle for heavy and painful menses. Desires to continue cryselle  She requests STI screening with pap but no symptoms of STI  Patient's last menstrual period was 2018 (exact date).    Past Medical History:   Diagnosis Date   • Diverticulitis    • Hepatitis    • Intestinal disease    • UTI (urinary tract infection)         Current Outpatient Prescriptions:   •  amitriptyline (ELAVIL) 25 MG tablet, Take 1 tablet by mouth Every Night., Disp: 30 tablet, Rfl: 11  •  Mirabegron ER (MYRBETRIQ) 50 MG tablet sustained-release 24 hour 24 hr tablet, Take 50 mg by mouth Daily., Disp: 30 tablet, Rfl: 11  •  norgestrel-ethinyl estradiol (CRYSELLE-28) 0.3-30 MG-MCG per tablet, Take 1 tablet by mouth Daily., Disp: 28 tablet, Rfl: 6  •  traMADol (ULTRAM) 50 MG tablet, Take 1 tablet by mouth Every 6 (Six) Hours As Needed for Severe Pain ., Disp: 10 tablet, Rfl: 0  No current facility-administered medications for this visit.    Allergies   Allergen Reactions   • Bactrim [Sulfamethoxazole-Trimethoprim] Rash   • Sulfa Antibiotics Rash      Past Surgical History:   Procedure Laterality Date   • BILATERAL BREAST REDUCTION     • COLONOSCOPY     • WISDOM TOOTH EXTRACTION        Social History     Social History   • Marital status: Single     Spouse name: N/A   • Number of children: N/A   • Years of education: N/A     Occupational History   • Not on file.     Social History Main Topics   • Smoking status: Former Smoker   • Smokeless tobacco: Never Used   • Alcohol use No   • Drug use: Yes     Types: Marijuana   • Sexual activity: Not Currently     Partners: Male     Birth control/ protection: OCP      Comment: Nuva Ring  "    Other Topics Concern   • Not on file     Social History Narrative   • No narrative on file      Family History   Problem Relation Age of Onset   • Diverticulosis Father    • No Known Problems Mother        Review of Systems   Constitutional: Negative.    Gastrointestinal: Negative.    Genitourinary: Negative.            Objective   /70   Ht 152.4 cm (60\")   Wt 78.7 kg (173 lb 9.6 oz)   LMP 07/17/2018 (Exact Date)   Breastfeeding? No   BMI 33.90 kg/m²     Physical Exam   Constitutional: She appears well-developed and well-nourished. She is cooperative.   Pulmonary/Chest: Right breast exhibits no inverted nipple, no mass, no nipple discharge, no skin change and no tenderness. Left breast exhibits no inverted nipple, no mass, no nipple discharge, no skin change and no tenderness.   Abdominal: Soft. Normal appearance. There is no tenderness.   Genitourinary: Vagina normal and uterus normal. There is no tenderness or lesion on the right labia. There is no tenderness or lesion on the left labia. Cervix exhibits no motion tenderness and no discharge. Right adnexum displays no mass and no tenderness. Left adnexum displays no mass and no tenderness.   Neurological: She is alert.   Skin: Skin is warm and dry.   Psychiatric: She has a normal mood and affect. Her behavior is normal.            Assessment and Plan  Janae was seen today for gynecologic exam.    Diagnoses and all orders for this visit:    Encounter for gynecological examination without abnormal finding    Screening for malignant neoplasm of cervix  -     Liquid-based Pap Smear, Screening; Future    Screening for STD (sexually transmitted disease)      Patient Instructions   GC CT screening done with pap  STI prevention  Recommend self breast exam monthly             This note was electronically signed.    Ramona Hernandez PA-C   August 6, 2018  "

## 2018-08-15 DIAGNOSIS — Z12.4 SCREENING FOR MALIGNANT NEOPLASM OF CERVIX: ICD-10-CM

## 2018-09-18 ENCOUNTER — PROCEDURE VISIT (OUTPATIENT)
Dept: UROLOGY | Facility: CLINIC | Age: 23
End: 2018-09-18

## 2018-09-18 DIAGNOSIS — N30.10 CYSTITIS, INTERSTITIAL: Primary | ICD-10-CM

## 2018-09-18 LAB
BILIRUB BLD-MCNC: NEGATIVE MG/DL
CLARITY, POC: CLEAR
COLOR UR: YELLOW
GLUCOSE UR STRIP-MCNC: NEGATIVE MG/DL
KETONES UR QL: NEGATIVE
LEUKOCYTE EST, POC: NEGATIVE
NITRITE UR-MCNC: NEGATIVE MG/ML
PH UR: 7 [PH] (ref 5–8)
PROT UR STRIP-MCNC: NEGATIVE MG/DL
RBC # UR STRIP: ABNORMAL /UL
SP GR UR: 1.02 (ref 1–1.03)
UROBILINOGEN UR QL: NORMAL

## 2018-09-18 PROCEDURE — 81003 URINALYSIS AUTO W/O SCOPE: CPT | Performed by: UROLOGY

## 2018-09-18 PROCEDURE — 51700 IRRIGATION OF BLADDER: CPT | Performed by: UROLOGY

## 2018-10-16 ENCOUNTER — CONSULT (OUTPATIENT)
Dept: CARDIOLOGY | Facility: CLINIC | Age: 23
End: 2018-10-16

## 2018-10-16 VITALS
SYSTOLIC BLOOD PRESSURE: 114 MMHG | WEIGHT: 178.8 LBS | BODY MASS INDEX: 35.1 KG/M2 | DIASTOLIC BLOOD PRESSURE: 72 MMHG | OXYGEN SATURATION: 99 % | HEIGHT: 60 IN | HEART RATE: 81 BPM | RESPIRATION RATE: 16 BRPM

## 2018-10-16 DIAGNOSIS — R07.2 PRECORDIAL PAIN: Primary | ICD-10-CM

## 2018-10-16 DIAGNOSIS — R06.02 SOB (SHORTNESS OF BREATH): ICD-10-CM

## 2018-10-16 DIAGNOSIS — R00.2 PALPITATIONS: ICD-10-CM

## 2018-10-16 PROCEDURE — 99204 OFFICE O/P NEW MOD 45 MIN: CPT | Performed by: INTERNAL MEDICINE

## 2018-10-16 NOTE — PROGRESS NOTES
Subjective:     Encounter Date:10/16/2018      Patient ID: Janae Padilla is a 22 y.o. female.    Chief Complaint: Chest pain, shortness of breath and palpitations  HPI  This is a 22-year-old female patient who reports to cardiology clinic for evaluation of atypical chest pain.  The patient indicates that she has been experiencing chest discomfort for the last one year.  She describes having a discretely localized area of sharp stabbing pain just under her left clavicle.  This occurs on a daily basis and will last for 30 seconds-60 seconds.  He has a 5-6/10 in intensity and does not radiate.  It is associated with shortness of breath but no nausea vomiting or diaphoresis.  The patient cannot identify any precipitating aggravating or alleviating features.  She has no personal history of hypertension diabetes or elevated cholesterol.  She has no history of myocardial infarction or coronary revascularization.  There is no history of childhood murmurs or rheumatic fever or infective endocarditis.  She also reports having shortness of breath which has been occurring for one year.  This begins when she is standing and she also notices that if she walks up stairs.  The patient indicates that she walks between 18,000-20,000 steps per day.  There is no orthopnea PND or lower extremity edema.  The patient also reports having intermittent episodes of palpitations which she describes as a sense that her heart is fluttering.  This also has been occurring for the last one year.  There is no associated dizziness or syncope.  She has no history of documented arrhythmia.  She is a former smoker but quit smoking 5 years ago.  Her family history is strongly positive for premature coronary disease.  The following portions of the patient's history were reviewed and updated as appropriate: allergies, current medications, past family history, past medical history, past social history, past surgical history and problem  Review of  Systems   Constitution: Positive for malaise/fatigue. Negative for chills, diaphoresis, fever, weakness, weight gain and weight loss.   HENT: Negative for ear discharge, hearing loss, hoarse voice and nosebleeds.    Eyes: Negative for discharge, double vision, pain and photophobia.   Cardiovascular: Positive for chest pain, dyspnea on exertion, irregular heartbeat and palpitations. Negative for claudication, cyanosis, leg swelling, near-syncope, orthopnea, paroxysmal nocturnal dyspnea and syncope.   Respiratory: Positive for shortness of breath. Negative for cough, hemoptysis, sputum production and wheezing.    Endocrine: Negative for cold intolerance, heat intolerance, polydipsia, polyphagia and polyuria.   Hematologic/Lymphatic: Negative for adenopathy and bleeding problem. Does not bruise/bleed easily.   Skin: Negative for color change, flushing, itching and rash.   Musculoskeletal: Negative for muscle cramps, muscle weakness, myalgias and stiffness.   Gastrointestinal: Negative for abdominal pain, diarrhea, hematemesis, hematochezia, nausea and vomiting.   Genitourinary: Negative for dysuria, frequency and nocturia.   Neurological: Negative for focal weakness, loss of balance, numbness, paresthesias and seizures.   Psychiatric/Behavioral: Negative for altered mental status, hallucinations and suicidal ideas.   Allergic/Immunologic: Negative for HIV exposure, hives and persistent infections.           Current Outpatient Prescriptions:   •  amitriptyline (ELAVIL) 25 MG tablet, Take 1 tablet by mouth Every Night., Disp: 30 tablet, Rfl: 11  •  Mirabegron ER (MYRBETRIQ) 50 MG tablet sustained-release 24 hour 24 hr tablet, Take 50 mg by mouth Daily., Disp: 30 tablet, Rfl: 11  •  norgestrel-ethinyl estradiol (CRYSELLE-28) 0.3-30 MG-MCG per tablet, Take 1 tablet by mouth Daily., Disp: 28 tablet, Rfl: 6  No current facility-administered medications for this visit.      Objective:     Physical Exam   Constitutional: She  "is oriented to person, place, and time. She appears well-developed and well-nourished. No distress.   HENT:   Head: Normocephalic and atraumatic.   Mouth/Throat: Oropharynx is clear and moist.   Eyes: Pupils are equal, round, and reactive to light. Conjunctivae and EOM are normal. No scleral icterus.   Neck: Normal range of motion. Neck supple. No JVD present. No tracheal deviation present. No thyromegaly present.   Cardiovascular: Normal rate, regular rhythm, S1 normal, S2 normal, normal heart sounds, intact distal pulses and normal pulses.  PMI is not displaced.  Exam reveals no gallop and no friction rub.    No murmur heard.  Pulmonary/Chest: Effort normal and breath sounds normal. No respiratory distress. She has no wheezes. She has no rales.   Abdominal: Soft. Bowel sounds are normal. She exhibits no distension and no mass. There is no tenderness. There is no rebound and no guarding.   Musculoskeletal: Normal range of motion. She exhibits no edema or deformity.   Neurological: She is alert and oriented to person, place, and time. She displays normal reflexes. No cranial nerve deficit. Coordination normal.   Skin: Skin is warm and dry. No rash noted. She is not diaphoretic. No erythema.   Psychiatric: She has a normal mood and affect. Her behavior is normal. Thought content normal.     Blood pressure 114/72, pulse 81, resp. rate 16, height 152.4 cm (60\"), weight 81.1 kg (178 lb 12.8 oz), SpO2 99 %, not currently breastfeeding.   Lab Review:       Assessment:         1. Precordial pain  The patient's chest discomfort has features both typical and atypical for coronary insufficiency.  The patient has never had an ischemic evaluation.  The patient has multiple risk factors for coronary artery disease.  The patient is able to do treadmill exercise stress testing .    2. SOB (shortness of breath)  This is multifactorial in etiology. .  There may be an element of unrecognized congestive heart failure.  This could also " represent an angina equivalent.  This could be related to former tobacco abuse.  She has no history of documented underlying lung disease.  This could also be related to arrhythmia.    3. Palpitations  Some of the patient's symptoms could be due to underlying arrhythmia and/or ectopy.  She has never worn an outpatient heart monitor.    Procedures     Plan:       I have recommended a treadmill exercise stress test as well as an echocardiogram.  I have recommended an outpatient Holter monitor.  No changes in her medication therapy and been made at today's visit.  Further recommendations will be predicated on the results of her outpatient testing.  The patient has been congratulated on her smoking cessation and cautioned to never again resumed cigarette smoking.  She has been encouraged to maintain an active lifestyle.

## 2018-11-02 LAB
BH CV ECHO MEAS - % IVS THICK: 31.8 %
BH CV ECHO MEAS - % LVPW THICK: 25 %
BH CV ECHO MEAS - AO MAX PG (FULL): 2.4 MMHG
BH CV ECHO MEAS - AO MAX PG: 6 MMHG
BH CV ECHO MEAS - AO MEAN PG (FULL): 1 MMHG
BH CV ECHO MEAS - AO MEAN PG: 3 MMHG
BH CV ECHO MEAS - AO ROOT AREA: 3.8 CM^2
BH CV ECHO MEAS - AO ROOT DIAM: 2.2 CM
BH CV ECHO MEAS - AO V2 MAX: 118.5 CM/SEC
BH CV ECHO MEAS - AO V2 MEAN: 74.9 CM/SEC
BH CV ECHO MEAS - AO V2 VTI: 20.7 CM
BH CV ECHO MEAS - AVA(I,A): 2.4 CM^2
BH CV ECHO MEAS - AVA(I,D): 2.4 CM^2
BH CV ECHO MEAS - AVA(V,A): 2.5 CM^2
BH CV ECHO MEAS - AVA(V,D): 2.5 CM^2
BH CV ECHO MEAS - EDV(CUBED): 128.8 ML
BH CV ECHO MEAS - EDV(MOD-SP4): 139 ML
BH CV ECHO MEAS - EDV(TEICH): 121 ML
BH CV ECHO MEAS - EF(CUBED): 74.6 %
BH CV ECHO MEAS - EF(MOD-SP4): 66.2 %
BH CV ECHO MEAS - EF(TEICH): 66.2 %
BH CV ECHO MEAS - ESV(CUBED): 32.8 ML
BH CV ECHO MEAS - ESV(MOD-SP4): 47 ML
BH CV ECHO MEAS - ESV(TEICH): 41 ML
BH CV ECHO MEAS - FS: 36.6 %
BH CV ECHO MEAS - IVS/LVPW: 1.4
BH CV ECHO MEAS - IVSD: 0.8 CM
BH CV ECHO MEAS - IVSS: 1.5 CM
BH CV ECHO MEAS - LA DIMENSION: 3 CM
BH CV ECHO MEAS - LA/AO: 1.4
BH CV ECHO MEAS - LAD MAJOR: 4.9 CM
BH CV ECHO MEAS - LAT PEAK E' VEL: 17.7 CM/SEC
BH CV ECHO MEAS - LATERAL E/E' RATIO: 4.4
BH CV ECHO MEAS - LV IVRT: 0.1 SEC
BH CV ECHO MEAS - LV MASS(C)D: 172.8 GRAMS
BH CV ECHO MEAS - LV MASS(C)S: 123.4 GRAMS
BH CV ECHO MEAS - LV MAX PG: 3.6 MMHG
BH CV ECHO MEAS - LV MEAN PG: 2 MMHG
BH CV ECHO MEAS - LV V1 MAX: 94.3 CM/SEC
BH CV ECHO MEAS - LV V1 MEAN: 57.3 CM/SEC
BH CV ECHO MEAS - LV V1 VTI: 16.1 CM
BH CV ECHO MEAS - LVIDD: 5.1 CM
BH CV ECHO MEAS - LVIDS: 3.2 CM
BH CV ECHO MEAS - LVLD AP4: 8.9 CM
BH CV ECHO MEAS - LVLS AP4: 6.4 CM
BH CV ECHO MEAS - LVOT AREA (M): 3.1 CM^2
BH CV ECHO MEAS - LVOT AREA: 3.1 CM^2
BH CV ECHO MEAS - LVOT DIAM: 2 CM
BH CV ECHO MEAS - LVPWD: 0.8 CM
BH CV ECHO MEAS - LVPWS: 1 CM
BH CV ECHO MEAS - MED PEAK E' VEL: 11.9 CM/SEC
BH CV ECHO MEAS - MEDIAL E/E' RATIO: 6.6
BH CV ECHO MEAS - MV A MAX VEL: 44.7 CM/SEC
BH CV ECHO MEAS - MV DEC SLOPE: 353 CM/SEC^2
BH CV ECHO MEAS - MV DEC TIME: 0.26 SEC
BH CV ECHO MEAS - MV E MAX VEL: 78.4 CM/SEC
BH CV ECHO MEAS - MV E/A: 1.8
BH CV ECHO MEAS - MV MAX PG: 2.6 MMHG
BH CV ECHO MEAS - MV MEAN PG: 1 MMHG
BH CV ECHO MEAS - MV P1/2T MAX VEL: 76.3 CM/SEC
BH CV ECHO MEAS - MV P1/2T: 63.3 MSEC
BH CV ECHO MEAS - MV V2 MAX: 81.3 CM/SEC
BH CV ECHO MEAS - MV V2 MEAN: 46.3 CM/SEC
BH CV ECHO MEAS - MV V2 VTI: 15.6 CM
BH CV ECHO MEAS - MVA P1/2T LCG: 2.9 CM^2
BH CV ECHO MEAS - MVA(P1/2T): 3.5 CM^2
BH CV ECHO MEAS - MVA(VTI): 3.2 CM^2
BH CV ECHO MEAS - PA MAX PG: 3.9 MMHG
BH CV ECHO MEAS - PA V2 MAX: 99 CM/SEC
BH CV ECHO MEAS - RAP SYSTOLE: 10 MMHG
BH CV ECHO MEAS - RVSP: 21 MMHG
BH CV ECHO MEAS - SV(AO): 78.7 ML
BH CV ECHO MEAS - SV(CUBED): 96 ML
BH CV ECHO MEAS - SV(LVOT): 50.6 ML
BH CV ECHO MEAS - SV(MOD-SP4): 92 ML
BH CV ECHO MEAS - SV(TEICH): 80 ML
BH CV ECHO MEAS - TR MAX PG: 11 MMHG
BH CV ECHO MEAS - TR MAX VEL: 163.5 CM/SEC
BH CV ECHO MEAS - TV MAX PG: 2.2 MMHG
BH CV ECHO MEAS - TV V2 MAX: 73.6 CM/SEC
BH CV ECHO MEASUREMENTS AVERAGE E/E' RATIO: 5.3
BH CV STRESS BP STAGE 1: NORMAL
BH CV STRESS BP STAGE 2: NORMAL
BH CV STRESS BP STAGE 3: NORMAL
BH CV STRESS BP STAGE 4: NORMAL
BH CV STRESS DURATION MIN STAGE 1: 3
BH CV STRESS DURATION MIN STAGE 2: 3
BH CV STRESS DURATION MIN STAGE 3: 3
BH CV STRESS DURATION MIN STAGE 4: 3
BH CV STRESS DURATION MIN STAGE 5: 3
BH CV STRESS DURATION SEC STAGE 1: 0
BH CV STRESS DURATION SEC STAGE 2: 0
BH CV STRESS DURATION SEC STAGE 3: 0
BH CV STRESS DURATION SEC STAGE 4: 0
BH CV STRESS DURATION SEC STAGE 5: 0
BH CV STRESS GRADE STAGE 1: 10
BH CV STRESS GRADE STAGE 2: 12
BH CV STRESS GRADE STAGE 3: 14
BH CV STRESS GRADE STAGE 4: 16
BH CV STRESS GRADE STAGE 5: 18
BH CV STRESS HR STAGE 1: 135
BH CV STRESS HR STAGE 2: 157
BH CV STRESS HR STAGE 3: 157
BH CV STRESS HR STAGE 4: 174
BH CV STRESS HR STAGE 5: 182
BH CV STRESS METS STAGE 1: 5
BH CV STRESS METS STAGE 2: 7.5
BH CV STRESS METS STAGE 3: 10
BH CV STRESS METS STAGE 4: 13.5
BH CV STRESS METS STAGE 5: 15
BH CV STRESS O2 STAGE 1: 99
BH CV STRESS O2 STAGE 2: 99
BH CV STRESS O2 STAGE 3: 99
BH CV STRESS O2 STAGE 4: 97
BH CV STRESS O2 STAGE 5: 97
BH CV STRESS PROTOCOL 1: NORMAL
BH CV STRESS RECOVERY BP: NORMAL MMHG
BH CV STRESS RECOVERY HR: 93 BPM
BH CV STRESS RECOVERY O2: 98 %
BH CV STRESS SPEED STAGE 1: 1.7
BH CV STRESS SPEED STAGE 2: 2.5
BH CV STRESS SPEED STAGE 3: 3.4
BH CV STRESS SPEED STAGE 4: 4.2
BH CV STRESS SPEED STAGE 5: 5
BH CV STRESS STAGE 1: 1
BH CV STRESS STAGE 2: 2
BH CV STRESS STAGE 3: 3
BH CV STRESS STAGE 4: 4
BH CV STRESS STAGE 5: 5
LV EF 2D ECHO EST: 70 %
MAXIMAL PREDICTED HEART RATE: 198 BPM
PERCENT MAX PREDICTED HR: 91.92 %
STRESS BASELINE BP: NORMAL MMHG
STRESS BASELINE HR: 78 BPM
STRESS O2 SAT REST: 98 %
STRESS PERCENT HR: 108 %
STRESS POST ESTIMATED WORKLOAD: 10.1 METS
STRESS POST EXERCISE DUR MIN: 9 MIN
STRESS POST EXERCISE DUR SEC: 2 SEC
STRESS POST O2 SAT PEAK: 97 %
STRESS POST PEAK BP: NORMAL MMHG
STRESS POST PEAK HR: 182 BPM
STRESS TARGET HR: 168 BPM

## 2018-11-20 ENCOUNTER — OFFICE VISIT (OUTPATIENT)
Dept: CARDIOLOGY | Facility: CLINIC | Age: 23
End: 2018-11-20

## 2018-11-20 VITALS
DIASTOLIC BLOOD PRESSURE: 78 MMHG | WEIGHT: 178 LBS | RESPIRATION RATE: 18 BRPM | BODY MASS INDEX: 34.95 KG/M2 | HEART RATE: 92 BPM | OXYGEN SATURATION: 98 % | HEIGHT: 60 IN | SYSTOLIC BLOOD PRESSURE: 102 MMHG

## 2018-11-20 DIAGNOSIS — R06.02 SOB (SHORTNESS OF BREATH): ICD-10-CM

## 2018-11-20 DIAGNOSIS — R07.2 PRECORDIAL PAIN: Primary | ICD-10-CM

## 2018-11-20 DIAGNOSIS — R00.2 PALPITATIONS: ICD-10-CM

## 2018-11-20 PROCEDURE — 99214 OFFICE O/P EST MOD 30 MIN: CPT | Performed by: INTERNAL MEDICINE

## 2018-11-20 NOTE — PROGRESS NOTES
Subjective:     Encounter Date:11/20/2018      Patient ID: Janae Padilla is a 23 y.o. female.    Chief Complaint: Chest pain  HPI  This is a 23-year-old female patient who recently underwent a battery of outpatient testing to evaluate chest discomfort shortness of breath and palpitations.  The patient indicates that her palpitations and shortness of breath have largely improved without specific intervention.  She continues to have chest discomfort which she describes as a retrosternal burning discomfort worse if she eats greasy or fried foods.  It is typically improved with antacid therapy.  The patient underwent a treadmill exercise stress test in which she exercised for over 12 metabolic equivalents of task without chest discomfort shortness of breath or ischemic EKG changes.  She achieved target heart rate with a normal heart rate and blood pressure response to exercise and no exercise-induced arrhythmia.  The patient also worked cardiac monitor which was normal.  There was normal heart rate variability.  The patient had over 40 symptomatic activations with no correlation between patient's symptoms and underlying arrhythmia and/or ectopy.  The patient demonstrated no atrial fibrillation, atrial flutter, paroxysmal atrial tachycardia or paroxysmal supraventricular tachycardia.  There was no ventricular tachycardia.  There were no PACs and there were no PVCs.  The patient had no significant bradycardia during resting hours and there was no conduction disturbance, heart block or pause greater than 2.0 seconds.  The patient also underwent an echocardiogram which was normal. The echocardiogram showed no evidence of ventricular hypertrophy or cardiac chamber enlargement.  Left ventricular systolic and diastolic function was normal.  There was no evidence of valvular pathology of significance, pericardial disease, pulmonary hypertension or intracardiac shunting.  The left ventricular ejection fraction was normal  and there were no regional wall motion abnormalities.  She remains a nonsmoker.    The following portions of the patient's history were reviewed and updated as appropriate: allergies, current medications, past family history, past medical history, past social history, past surgical history and problem  Review of Systems   Constitution: Negative for chills, diaphoresis, fever, malaise/fatigue, weight gain and weight loss.   HENT: Negative for ear discharge, hearing loss, hoarse voice and nosebleeds.    Eyes: Negative for discharge, double vision, pain and photophobia.   Cardiovascular: Positive for chest pain. Negative for claudication, cyanosis, dyspnea on exertion, irregular heartbeat, leg swelling, near-syncope, orthopnea, palpitations, paroxysmal nocturnal dyspnea and syncope.   Respiratory: Negative for cough, hemoptysis, shortness of breath, sputum production and wheezing.    Endocrine: Negative for cold intolerance, heat intolerance, polydipsia, polyphagia and polyuria.   Hematologic/Lymphatic: Negative for adenopathy and bleeding problem. Does not bruise/bleed easily.   Skin: Negative for color change, flushing, itching and rash.   Musculoskeletal: Negative for muscle cramps, muscle weakness, myalgias and stiffness.   Gastrointestinal: Negative for abdominal pain, diarrhea, hematemesis, hematochezia, nausea and vomiting.   Genitourinary: Negative for dysuria, frequency and nocturia.   Neurological: Negative for focal weakness, loss of balance, numbness, paresthesias and seizures.   Psychiatric/Behavioral: Negative for altered mental status, hallucinations and suicidal ideas.   Allergic/Immunologic: Negative for HIV exposure, hives and persistent infections.           Current Outpatient Medications:   •  amitriptyline (ELAVIL) 25 MG tablet, Take 1 tablet by mouth Every Night., Disp: 30 tablet, Rfl: 11  •  Mirabegron ER (MYRBETRIQ) 50 MG tablet sustained-release 24 hour 24 hr tablet, Take 50 mg by mouth Daily.,  "Disp: 30 tablet, Rfl: 11  •  norgestrel-ethinyl estradiol (CRYSELLE-28) 0.3-30 MG-MCG per tablet, Take 1 tablet by mouth Daily., Disp: 28 tablet, Rfl: 6  No current facility-administered medications for this visit.      Objective:     Physical Exam   Constitutional: She is oriented to person, place, and time. She appears well-developed and well-nourished. No distress.   HENT:   Head: Normocephalic and atraumatic.   Mouth/Throat: Oropharynx is clear and moist.   Eyes: Conjunctivae and EOM are normal. Pupils are equal, round, and reactive to light. No scleral icterus.   Neck: Normal range of motion. Neck supple. No JVD present. No tracheal deviation present. No thyromegaly present.   Cardiovascular: Normal rate, regular rhythm, S1 normal, S2 normal, normal heart sounds, intact distal pulses and normal pulses. PMI is not displaced. Exam reveals no gallop and no friction rub.   No murmur heard.  Pulmonary/Chest: Effort normal and breath sounds normal. No respiratory distress. She has no wheezes. She has no rales.   Abdominal: Soft. Bowel sounds are normal. She exhibits no distension and no mass. There is no tenderness. There is no rebound and no guarding.   Musculoskeletal: Normal range of motion. She exhibits no edema or deformity.   Neurological: She is alert and oriented to person, place, and time. She displays normal reflexes. No cranial nerve deficit. Coordination normal.   Skin: Skin is warm and dry. No rash noted. She is not diaphoretic. No erythema.   Psychiatric: She has a normal mood and affect. Her behavior is normal. Thought content normal.     Blood pressure 102/78, pulse 92, resp. rate 18, height 152.4 cm (60\"), weight 80.7 kg (178 lb), SpO2 98 %, not currently breastfeeding.   Lab Review:       Assessment:         1. Precordial pain  Noncardiac chest pain.  No evidence of inducible ischemia despite high cardiac workload at stress testing.  I suspect her chest discomfort is esophageal in etiology.    2. " SOB (shortness of breath)  No evidence of a cardiac etiology to her shortness of breath.  Her shortness of breath has largely resolved without specific intervention.  Echocardiogram shows no evidence of systolic or diastolic heart failure.  There is no valvular abnormalities, pericardial disease or pulmonary hypertension.  Stress testing shows no inducible ischemia.    3. Palpitations  No evidence of arrhythmia or ectopy.  No correlation between patient's symptoms and rhythm disturbance.    Procedures     Plan:       No further cardiovascular testing is indicated.  No changes in her medication therapy is warranted.  The patient is instructed to follow-up with her primary care provider.  If her chest pain persists she may benefit from evaluation from a gastroenterologist.  The importance of diet exercise and weight management have been reinforced to the patient.

## 2018-12-17 ENCOUNTER — OFFICE VISIT (OUTPATIENT)
Dept: UROLOGY | Facility: CLINIC | Age: 23
End: 2018-12-17

## 2018-12-17 DIAGNOSIS — N30.10 CYSTITIS, INTERSTITIAL: Primary | ICD-10-CM

## 2018-12-17 PROCEDURE — 81003 URINALYSIS AUTO W/O SCOPE: CPT | Performed by: UROLOGY

## 2018-12-17 PROCEDURE — 51700 IRRIGATION OF BLADDER: CPT | Performed by: UROLOGY

## 2018-12-17 PROCEDURE — 99213 OFFICE O/P EST LOW 20 MIN: CPT | Performed by: UROLOGY

## 2018-12-17 NOTE — PROGRESS NOTES
Chief Complaint  Interstitial Cystitis (6 month fup.)      HPI  Janae Padilla is a 23 y.o.female who returns today for semiannual visit with a history of chronic interstitial cystitis.  She's been fairly stable on Myrbetriq and amitriptyline 25 mg dosage but is requesting a bladder installation today.  Her urine is not infected but has the usual trace of blood.    There were no vitals filed for this visit.    Past Medical History  Past Medical History:   Diagnosis Date   • Diverticulitis    • GERD (gastroesophageal reflux disease)    • Hepatitis    • Interstitial cystitis    • Intestinal disease    • UTI (urinary tract infection)        Past Surgical History  Past Surgical History:   Procedure Laterality Date   • BILATERAL BREAST REDUCTION     • COLONOSCOPY     • WISDOM TOOTH EXTRACTION         Medications  has a current medication list which includes the following prescription(s): amitriptyline, mirabegron er, and norgestrel-ethinyl estradiol, and the following Facility-Administered Medications: dimethyl sulfoxide.    Allergies  Allergies   Allergen Reactions   • Bactrim [Sulfamethoxazole-Trimethoprim] Rash   • Sulfa Antibiotics Rash       Social History  Social History     Socioeconomic History   • Marital status: Single     Spouse name: Not on file   • Number of children: Not on file   • Years of education: Not on file   • Highest education level: Not on file   Social Needs   • Financial resource strain: Not on file   • Food insecurity - worry: Not on file   • Food insecurity - inability: Not on file   • Transportation needs - medical: Not on file   • Transportation needs - non-medical: Not on file   Occupational History   • Not on file   Tobacco Use   • Smoking status: Former Smoker     Years: 5.00     Types: Cigarettes     Last attempt to quit: 10/16/2017     Years since quittin.1   • Smokeless tobacco: Never Used   Substance and Sexual Activity   • Alcohol use: No   • Drug use: Yes     Types:  Marijuana   • Sexual activity: Not Currently     Partners: Male     Birth control/protection: OCP     Comment: Nuva Ring   Other Topics Concern   • Not on file   Social History Narrative   • Not on file       Family History  Family History   Problem Relation Age of Onset   • Diverticulosis Father    • Hypertension Maternal Grandmother    • Hyperlipidemia Maternal Grandmother    • Arthritis Maternal Grandfather    • Stroke Maternal Grandfather    • Heart attack Maternal Grandfather    • Cancer Other        Review of Systems  Review of Systems   Constitutional: Negative.    Genitourinary: Positive for frequency and urgency.   All other systems reviewed and are negative.      Physical Exam  Physical Exam    Labs recent and today in the office:  Results for orders placed or performed in visit on 12/17/18   POC Urinalysis Dipstick, Automated   Result Value Ref Range    Color Yellow Yellow, Straw, Dark Yellow, Henna    Clarity, UA Clear Clear    Specific Gravity  1.030 1.005 - 1.030    pH, Urine 5.5 5.0 - 8.0    Leukocytes Negative Negative    Nitrite, UA Negative Negative    Protein, POC Negative Negative mg/dL    Glucose, UA Negative Negative, 1000 mg/dL (3+) mg/dL    Ketones, UA Negative Negative    Urobilinogen, UA Normal Normal    Bilirubin Negative Negative    Blood, UA 3+ (A) Negative         Assessment & Plan  Chronic interstitial cystitis: She is given a DMSO rescue solution and encouraged to continue the Myrbetriq and amitriptyline.  She'll return in 6 months and when necessary.  15 minutes is spent counseling the patient out of a 30 minute visit.

## 2018-12-28 RX ORDER — NORGESTREL-ETHINYL ESTRADIOL 0.3-0.03MG
TABLET ORAL
Qty: 28 TABLET | Refills: 6 | Status: SHIPPED | OUTPATIENT
Start: 2018-12-28 | End: 2019-10-09

## 2018-12-30 DIAGNOSIS — N30.10 IC (INTERSTITIAL CYSTITIS): ICD-10-CM

## 2019-01-03 DIAGNOSIS — N30.10 IC (INTERSTITIAL CYSTITIS): ICD-10-CM

## 2019-01-03 RX ORDER — AMITRIPTYLINE HYDROCHLORIDE 25 MG/1
TABLET, FILM COATED ORAL
Qty: 30 TABLET | Refills: 11 | Status: SHIPPED | OUTPATIENT
Start: 2019-01-03 | End: 2019-04-19 | Stop reason: SDUPTHER

## 2019-03-13 ENCOUNTER — PROCEDURE VISIT (OUTPATIENT)
Dept: UROLOGY | Facility: CLINIC | Age: 24
End: 2019-03-13

## 2019-03-13 VITALS
TEMPERATURE: 98.3 F | OXYGEN SATURATION: 96 % | HEART RATE: 84 BPM | SYSTOLIC BLOOD PRESSURE: 128 MMHG | DIASTOLIC BLOOD PRESSURE: 77 MMHG

## 2019-03-13 DIAGNOSIS — R30.0 DYSURIA: Primary | ICD-10-CM

## 2019-03-13 DIAGNOSIS — N30.10 CYSTITIS, INTERSTITIAL: ICD-10-CM

## 2019-03-13 PROCEDURE — 81003 URINALYSIS AUTO W/O SCOPE: CPT | Performed by: UROLOGY

## 2019-03-13 PROCEDURE — 51700 IRRIGATION OF BLADDER: CPT | Performed by: UROLOGY

## 2019-03-28 ENCOUNTER — HOSPITAL ENCOUNTER (EMERGENCY)
Facility: HOSPITAL | Age: 24
Discharge: HOME | End: 2019-03-28
Attending: EMERGENCY MEDICINE | Admitting: EMERGENCY MEDICINE

## 2019-03-28 VITALS
SYSTOLIC BLOOD PRESSURE: 115 MMHG | HEIGHT: 60 IN | DIASTOLIC BLOOD PRESSURE: 68 MMHG | HEART RATE: 86 BPM | BODY MASS INDEX: 34.87 KG/M2 | OXYGEN SATURATION: 97 % | WEIGHT: 177.6 LBS | TEMPERATURE: 97.4 F | RESPIRATION RATE: 28 BRPM
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
## 2019-03-28 DIAGNOSIS — M54.50 ACUTE BILATERAL LOW BACK PAIN WITHOUT SCIATICA: Primary | ICD-10-CM

## 2019-03-28 PROCEDURE — 99283 EMERGENCY DEPT VISIT LOW MDM: CPT

## 2019-03-28 RX ORDER — IBUPROFEN 600 MG/1
600 TABLET ORAL ONCE
Status: COMPLETED | OUTPATIENT
Start: 2019-03-28 | End: 2019-03-28

## 2019-03-28 RX ORDER — ACETAMINOPHEN 325 MG/1
650 TABLET ORAL ONCE
Status: COMPLETED | OUTPATIENT
Start: 2019-03-28 | End: 2019-03-28

## 2019-03-28 RX ORDER — CYCLOBENZAPRINE HCL 10 MG
10 TABLET ORAL ONCE
Status: COMPLETED | OUTPATIENT
Start: 2019-03-28 | End: 2019-03-28

## 2019-03-28 RX ADMIN — IBUPROFEN 600 MG: 600 TABLET, FILM COATED ORAL at 12:03

## 2019-03-28 RX ADMIN — ACETAMINOPHEN 650 MG: 325 TABLET, FILM COATED ORAL at 12:03

## 2019-03-28 RX ADMIN — CYCLOBENZAPRINE HYDROCHLORIDE 10 MG: 10 TABLET, FILM COATED ORAL at 12:03

## 2019-04-19 DIAGNOSIS — N30.10 IC (INTERSTITIAL CYSTITIS): ICD-10-CM

## 2019-04-19 RX ORDER — AMITRIPTYLINE HYDROCHLORIDE 25 MG/1
25 TABLET, FILM COATED ORAL
Qty: 30 TABLET | Refills: 11 | Status: SHIPPED | OUTPATIENT
Start: 2019-04-19 | End: 2020-01-23 | Stop reason: SDUPTHER

## 2019-05-15 ENCOUNTER — OFFICE VISIT (OUTPATIENT)
Dept: OBSTETRICS AND GYNECOLOGY | Facility: CLINIC | Age: 24
End: 2019-05-15

## 2019-05-15 VITALS
DIASTOLIC BLOOD PRESSURE: 66 MMHG | BODY MASS INDEX: 35.53 KG/M2 | WEIGHT: 181 LBS | SYSTOLIC BLOOD PRESSURE: 110 MMHG | HEIGHT: 60 IN

## 2019-05-15 DIAGNOSIS — N89.8 VAGINAL DISCHARGE: ICD-10-CM

## 2019-05-15 DIAGNOSIS — R10.2 PELVIC PAIN: Primary | ICD-10-CM

## 2019-05-15 DIAGNOSIS — N94.6 DYSMENORRHEA: ICD-10-CM

## 2019-05-15 PROCEDURE — 99214 OFFICE O/P EST MOD 30 MIN: CPT | Performed by: PHYSICIAN ASSISTANT

## 2019-05-15 NOTE — PROGRESS NOTES
"Subjective   Chief Complaint   Patient presents with   • Pelvic Pain     Patient states that she is having cramping all month and mostly on left side       Janae Padilla is a 23 y.o. year old  presenting to be seen for complaint of bilateral lower pelvic cramping that is more noted on her left side.  She reports this has been present for 3-4 months but for the past month it has been constant daily.   She is on Cryselle oc's and reports has a regular bleed on Cryselle. Has a lot of clotting and cramping with periods. Has previous had IUD which \"moved\". Also used DMPA for 2 years several years ago and did well with DMPA.  Patient's last menstrual period was 2019 (exact date).     She reports she has had dyspareunia for several months, she has painful bowel movements on occasion. She does not have dyspareunia but states has burning vaginally the past several times with intercourse. No vaginal discharge.  Reports has been diagnosed with interstitial cystitis and irritable bowel syndrome. Has diarrhea frequently.         Past Medical History:   Diagnosis Date   • Diverticulitis    • GERD (gastroesophageal reflux disease)    • Hepatitis    • Interstitial cystitis    • Intestinal disease    • UTI (urinary tract infection)         Current Outpatient Medications:   •  amitriptyline (ELAVIL) 25 MG tablet, Take 1 tablet by mouth every night at bedtime., Disp: 30 tablet, Rfl: 11  •  CRYSELLE-28 0.3-30 MG-MCG per tablet, TAKE 1 TABLET BY MOUTH ONCE DAILY, Disp: 28 tablet, Rfl: 6  •  Mirabegron ER (MYRBETRIQ) 50 MG tablet sustained-release 24 hour 24 hr tablet, Take 50 mg by mouth Daily., Disp: 30 tablet, Rfl: 11  •  fluconazole (DIFLUCAN) 150 MG tablet, One po now and repeat in 3 days, Disp: 2 tablet, Rfl: 0  No current facility-administered medications for this visit.    Allergies   Allergen Reactions   • Bactrim [Sulfamethoxazole-Trimethoprim] Rash   • Sulfa Antibiotics Rash      Past Surgical History: " "  Procedure Laterality Date   • BILATERAL BREAST REDUCTION     • COLONOSCOPY     • WISDOM TOOTH EXTRACTION        Social History     Socioeconomic History   • Marital status: Single     Spouse name: Not on file   • Number of children: Not on file   • Years of education: Not on file   • Highest education level: Not on file   Tobacco Use   • Smoking status: Former Smoker     Years: 5.00     Types: Cigarettes     Last attempt to quit: 10/16/2017     Years since quittin.5   • Smokeless tobacco: Never Used   Substance and Sexual Activity   • Alcohol use: No   • Drug use: Yes     Types: Marijuana   • Sexual activity: Yes     Partners: Male     Birth control/protection: OCP     Comment: Nuva Ring      Family History   Problem Relation Age of Onset   • Diverticulosis Father    • Hypertension Maternal Grandmother    • Hyperlipidemia Maternal Grandmother    • Arthritis Maternal Grandfather    • Stroke Maternal Grandfather    • Heart attack Maternal Grandfather    • Cancer Other        Review of Systems   Constitutional: Negative.    Gastrointestinal: Positive for abdominal pain and diarrhea.   Genitourinary: Positive for pelvic pain and vaginal pain. Negative for difficulty urinating, dysuria and vaginal discharge.           Objective   /66   Ht 152.4 cm (60\")   Wt 82.1 kg (181 lb)   LMP 2019 (Exact Date)   Breastfeeding? No   BMI 35.35 kg/m²     Physical Exam   Constitutional: She appears well-developed and well-nourished. She is cooperative. No distress.   Eyes: Conjunctivae, EOM and lids are normal.   Abdominal: Soft. Normal appearance. There is no tenderness. There is no rigidity and no guarding.   Genitourinary: Uterus normal. There is no tenderness or lesion on the right labia. There is no tenderness or lesion on the left labia. Cervix exhibits discharge. Cervix exhibits no motion tenderness and no friability. Right adnexum displays no mass and no tenderness. Left adnexum displays no mass and no " tenderness. No erythema, tenderness or bleeding in the vagina. No signs of injury around the vagina. Vaginal discharge found.   Genitourinary Comments: Thick clumpy curdy white discharge    Neurological: She is alert.   Skin: Skin is warm and dry.   Psychiatric: She has a normal mood and affect. Her speech is normal and behavior is normal.            Assessment and Plan  Janae was seen today for pelvic pain.    Diagnoses and all orders for this visit:    Pelvic pain  -     US Non-ob Transvaginal    Dysmenorrhea  -     US Non-ob Transvaginal    Vaginal discharge  -     NuSwab VG+ - Swab, Vagina    Other orders  -     fluconazole (DIFLUCAN) 150 MG tablet; One po now and repeat in 3 days      Patient Instructions   Will treat with Diflucan today as vaginal discharge c/w yeast  RTO for pelvic ultrasound for further evaluation of pelvic pain             This note was electronically signed.    Ramona Hernandez PA-C   May 16, 2019

## 2019-05-16 RX ORDER — FLUCONAZOLE 150 MG/1
TABLET ORAL
Qty: 2 TABLET | Refills: 0 | Status: SHIPPED | OUTPATIENT
Start: 2019-05-16 | End: 2019-10-09

## 2019-05-16 NOTE — PATIENT INSTRUCTIONS
Will treat with Diflucan today as vaginal discharge c/w yeast  RTO for pelvic ultrasound for further evaluation of pelvic pain

## 2019-05-18 LAB
A VAGINAE DNA VAG QL NAA+PROBE: NORMAL SCORE
BVAB2 DNA VAG QL NAA+PROBE: NORMAL SCORE
C ALBICANS DNA VAG QL NAA+PROBE: NEGATIVE
C GLABRATA DNA VAG QL NAA+PROBE: NEGATIVE
C TRACH RRNA SPEC QL NAA+PROBE: NEGATIVE
MEGA1 DNA VAG QL NAA+PROBE: NORMAL SCORE
N GONORRHOEA RRNA SPEC QL NAA+PROBE: NEGATIVE
T VAGINALIS RRNA SPEC QL NAA+PROBE: NEGATIVE

## 2019-08-02 ENCOUNTER — TELEPHONE (OUTPATIENT)
Dept: OBSTETRICS AND GYNECOLOGY | Facility: CLINIC | Age: 24
End: 2019-08-02

## 2019-08-02 NOTE — TELEPHONE ENCOUNTER
She may go ahead and get nexplanon if she wants and do pap later as she had a normal pap 8/6/18. Thanks

## 2019-08-02 NOTE — TELEPHONE ENCOUNTER
----- Message from Tracie Stewart sent at 8/2/2019 11:48 AM EDT -----  Contact: patient  Pt walked in today and was wanting to have a nexplanon inserted next week when she starts her cycle. She states that she had talked to you about it at her last appt. She was scheduled for her annual on 8/7/19 but will have to r/s this due to period. I told her she probably would need to have the pap first but she wanted to see if we could go ahead and order nexplanon and see if possible to get next week.    Jane    [phone]

## 2019-08-06 ENCOUNTER — OFFICE VISIT (OUTPATIENT)
Dept: OBSTETRICS AND GYNECOLOGY | Facility: CLINIC | Age: 24
End: 2019-08-06

## 2019-08-06 VITALS
BODY MASS INDEX: 34.63 KG/M2 | HEIGHT: 60 IN | WEIGHT: 176.4 LBS | SYSTOLIC BLOOD PRESSURE: 100 MMHG | DIASTOLIC BLOOD PRESSURE: 68 MMHG

## 2019-08-06 DIAGNOSIS — Z30.017 NEXPLANON INSERTION: Primary | ICD-10-CM

## 2019-08-06 LAB
B-HCG UR QL: NEGATIVE
INTERNAL NEGATIVE CONTROL: NEGATIVE
INTERNAL POSITIVE CONTROL: POSITIVE
Lab: NORMAL

## 2019-08-06 PROCEDURE — 81025 URINE PREGNANCY TEST: CPT | Performed by: PHYSICIAN ASSISTANT

## 2019-08-06 PROCEDURE — 11981 INSERTION DRUG DLVR IMPLANT: CPT | Performed by: PHYSICIAN ASSISTANT

## 2019-08-06 NOTE — PROGRESS NOTES
Nexplanon Insertion    Patient's last menstrual period was 08/02/2019 (exact date).    Date of procedure:  8/6/2019    Risks and benefits discussed? yes  All questions answered? yes  Consents given by the patient  Written consent obtained? yes    Local anesthesia used:  yes - 1.5 cc's of  Meds; anesthesia local: 1% lidocaine with epinephrine    Procedure documentation:    The upper left arm was marked at the intended site of insertion.  Betadine was used to cleanse the skin.  Local anesthesia was injected.  The Nexplanon was placed subdermally without difficulty.  The devise was able to be palpated in the arm by both myself and Janae.  Steri-strips were then placed across the site of insertion and the arm was wrapped.    She tolerated the procedure well.  There were no complications.  EBL was minimal.    Post procedure instructions: Remove the wrapping in 24 hours and the steri-strips in 5 days.    Follow up needed: PRN    This note was electronically signed.    Ramona Hernandez PA-C  August 6, 2019

## 2019-10-09 ENCOUNTER — OFFICE VISIT (OUTPATIENT)
Dept: OBSTETRICS AND GYNECOLOGY | Facility: CLINIC | Age: 24
End: 2019-10-09

## 2019-10-09 VITALS
SYSTOLIC BLOOD PRESSURE: 110 MMHG | BODY MASS INDEX: 34.75 KG/M2 | WEIGHT: 177 LBS | HEIGHT: 60 IN | DIASTOLIC BLOOD PRESSURE: 60 MMHG

## 2019-10-09 DIAGNOSIS — Z12.4 SCREENING FOR CERVICAL CANCER: ICD-10-CM

## 2019-10-09 DIAGNOSIS — Z01.419 ENCOUNTER FOR GYNECOLOGICAL EXAMINATION WITHOUT ABNORMAL FINDING: Primary | ICD-10-CM

## 2019-10-09 PROCEDURE — 99395 PREV VISIT EST AGE 18-39: CPT | Performed by: PHYSICIAN ASSISTANT

## 2019-10-09 NOTE — PROGRESS NOTES
Subjective   Chief Complaint   Patient presents with   • Gynecologic Exam       Janae Padilla is a 23 y.o. year old  presenting to be seen for her annual gynecological exam.   She has no complaints or concerns  Has Nexplanon placed in early August and periods have stopped with Nexplanon  She reports had negative STI screening with her pcp a few weeks ago and doesn't want to repeat at this time.       Past Medical History:   Diagnosis Date   • Diverticulitis    • GERD (gastroesophageal reflux disease)    • Hepatitis    • Interstitial cystitis    • Intestinal disease    • UTI (urinary tract infection)         Current Outpatient Medications:   •  amitriptyline (ELAVIL) 25 MG tablet, Take 1 tablet by mouth every night at bedtime., Disp: 30 tablet, Rfl: 11  •  Mirabegron ER (MYRBETRIQ) 50 MG tablet sustained-release 24 hour 24 hr tablet, Take 50 mg by mouth Daily., Disp: 30 tablet, Rfl: 11  No current facility-administered medications for this visit.    Allergies   Allergen Reactions   • Bactrim [Sulfamethoxazole-Trimethoprim] Rash   • Sulfa Antibiotics Rash      Past Surgical History:   Procedure Laterality Date   • BILATERAL BREAST REDUCTION     • COLONOSCOPY     • WISDOM TOOTH EXTRACTION        Social History     Socioeconomic History   • Marital status: Single     Spouse name: Not on file   • Number of children: Not on file   • Years of education: Not on file   • Highest education level: Not on file   Tobacco Use   • Smoking status: Former Smoker     Years: 5.00     Types: Cigarettes     Last attempt to quit: 10/16/2017     Years since quittin.9   • Smokeless tobacco: Never Used   Substance and Sexual Activity   • Alcohol use: No   • Drug use: Yes     Types: Marijuana   • Sexual activity: Yes     Partners: Male     Birth control/protection: OCP     Comment: Nuva Ring      Family History   Problem Relation Age of Onset   • Diverticulosis Father    • Hypertension Maternal Grandmother    •  "Hyperlipidemia Maternal Grandmother    • Arthritis Maternal Grandfather    • Stroke Maternal Grandfather    • Heart attack Maternal Grandfather    • Cancer Other        Review of Systems   Constitutional: Negative.    Gastrointestinal: Negative.    Genitourinary: Negative for difficulty urinating, dysuria, menstrual problem, pelvic pain and vaginal discharge.           Objective   /60   Ht 152.4 cm (60\")   Wt 80.3 kg (177 lb)   Breastfeeding? No   BMI 34.57 kg/m²     Physical Exam   Constitutional: She appears well-developed and well-nourished. She is cooperative. No distress.   Pulmonary/Chest: Right breast exhibits no inverted nipple, no mass, no nipple discharge, no skin change and no tenderness. Left breast exhibits no inverted nipple, no mass, no nipple discharge, no skin change and no tenderness.   Abdominal: Soft. Normal appearance. There is no tenderness.   Genitourinary: Vagina normal and uterus normal. There is no tenderness or lesion on the right labia. There is no tenderness or lesion on the left labia. Cervix exhibits no motion tenderness and no discharge. Right adnexum displays no mass and no tenderness. Left adnexum displays no mass and no tenderness.   Genitourinary Comments: Pap done   Neurological: She is alert.   Skin: Skin is warm and dry. No lesion noted.   Psychiatric: She has a normal mood and affect. Her behavior is normal. Thought content normal.            Assessment and Plan  Janae was seen today for gynecologic exam.    Diagnoses and all orders for this visit:    Encounter for gynecological examination without abnormal finding    Screening for cervical cancer  -     Liquid-based Pap Smear, Screening; Future      Patient Instructions   Encourage self breast exam monthly  Regular exercise  Condoms always             This note was electronically signed.    Ramona Hernandez PA-C   October 9, 2019  "

## 2019-10-18 DIAGNOSIS — Z12.4 SCREENING FOR CERVICAL CANCER: ICD-10-CM

## 2020-01-23 DIAGNOSIS — N30.10 IC (INTERSTITIAL CYSTITIS): ICD-10-CM

## 2020-01-23 RX ORDER — AMITRIPTYLINE HYDROCHLORIDE 25 MG/1
25 TABLET, FILM COATED ORAL
Qty: 30 TABLET | Refills: 11 | Status: SHIPPED | OUTPATIENT
Start: 2020-01-23 | End: 2021-11-02 | Stop reason: SDUPTHER

## 2020-08-02 ENCOUNTER — HOSPITAL ENCOUNTER (EMERGENCY)
Facility: HOSPITAL | Age: 25
Discharge: HOME | End: 2020-08-02
Attending: EMERGENCY MEDICINE | Admitting: EMERGENCY MEDICINE

## 2020-08-02 ENCOUNTER — APPOINTMENT (OUTPATIENT)
Dept: CT IMAGING | Facility: HOSPITAL | Age: 25
End: 2020-08-02

## 2020-08-02 VITALS
BODY MASS INDEX: 32.04 KG/M2 | RESPIRATION RATE: 18 BRPM | TEMPERATURE: 98.3 F | OXYGEN SATURATION: 100 % | HEIGHT: 60 IN | WEIGHT: 163.2 LBS | HEART RATE: 87 BPM | DIASTOLIC BLOOD PRESSURE: 74 MMHG | SYSTOLIC BLOOD PRESSURE: 105 MMHG

## 2020-08-02 DIAGNOSIS — K57.32 SIGMOID DIVERTICULITIS: Primary | ICD-10-CM

## 2020-08-02 LAB
ALBUMIN SERPL-MCNC: 4.2 G/DL (ref 3.5–5.2)
ALBUMIN/GLOB SERPL: 1.4 G/DL
ALP SERPL-CCNC: 85 U/L (ref 39–117)
ALT SERPL W P-5'-P-CCNC: 21 U/L (ref 1–33)
ANION GAP SERPL CALCULATED.3IONS-SCNC: 13.3 MMOL/L (ref 5–15)
AST SERPL-CCNC: 17 U/L (ref 1–32)
BACTERIA UR QL AUTO: ABNORMAL /HPF
BASOPHILS # BLD AUTO: 0.04 10*3/MM3 (ref 0–0.2)
BASOPHILS NFR BLD AUTO: 0.3 % (ref 0–1.5)
BILIRUB SERPL-MCNC: 0.9 MG/DL (ref 0–1.2)
BILIRUB UR QL STRIP: NEGATIVE
BUN SERPL-MCNC: 11 MG/DL (ref 6–20)
BUN/CREAT SERPL: 12.2 (ref 7–25)
CALCIUM SPEC-SCNC: 9.3 MG/DL (ref 8.6–10.5)
CHLORIDE SERPL-SCNC: 103 MMOL/L (ref 98–107)
CLARITY UR: CLEAR
CO2 SERPL-SCNC: 21.7 MMOL/L (ref 22–29)
COLOR UR: YELLOW
CREAT SERPL-MCNC: 0.9 MG/DL (ref 0.57–1)
DEPRECATED RDW RBC AUTO: 35.6 FL (ref 37–54)
EOSINOPHIL # BLD AUTO: 0.02 10*3/MM3 (ref 0–0.4)
EOSINOPHIL NFR BLD AUTO: 0.1 % (ref 0.3–6.2)
ERYTHROCYTE [DISTWIDTH] IN BLOOD BY AUTOMATED COUNT: 11.5 % (ref 12.3–15.4)
GFR SERPL CREATININE-BSD FRML MDRD: 78 ML/MIN/1.73
GLOBULIN UR ELPH-MCNC: 2.9 GM/DL
GLUCOSE SERPL-MCNC: 88 MG/DL (ref 65–99)
GLUCOSE UR STRIP-MCNC: NEGATIVE MG/DL
HCT VFR BLD AUTO: 38.4 % (ref 34–46.6)
HGB BLD-MCNC: 13.4 G/DL (ref 12–15.9)
HGB UR QL STRIP.AUTO: ABNORMAL
HYALINE CASTS UR QL AUTO: ABNORMAL /LPF
IMM GRANULOCYTES # BLD AUTO: 0.09 10*3/MM3 (ref 0–0.05)
IMM GRANULOCYTES NFR BLD AUTO: 0.6 % (ref 0–0.5)
KETONES UR QL STRIP: ABNORMAL
LEUKOCYTE ESTERASE UR QL STRIP.AUTO: NEGATIVE
LIPASE SERPL-CCNC: 16 U/L (ref 13–60)
LYMPHOCYTES # BLD AUTO: 1.62 10*3/MM3 (ref 0.7–3.1)
LYMPHOCYTES NFR BLD AUTO: 10.6 % (ref 19.6–45.3)
MCH RBC QN AUTO: 29.8 PG (ref 26.6–33)
MCHC RBC AUTO-ENTMCNC: 34.9 G/DL (ref 31.5–35.7)
MCV RBC AUTO: 85.3 FL (ref 79–97)
MONOCYTES # BLD AUTO: 1.28 10*3/MM3 (ref 0.1–0.9)
MONOCYTES NFR BLD AUTO: 8.3 % (ref 5–12)
NEUTROPHILS NFR BLD AUTO: 12.3 10*3/MM3 (ref 1.7–7)
NEUTROPHILS NFR BLD AUTO: 80.1 % (ref 42.7–76)
NITRITE UR QL STRIP: NEGATIVE
NRBC BLD AUTO-RTO: 0 /100 WBC (ref 0–0.2)
PH UR STRIP.AUTO: <=5 [PH] (ref 5–8)
PLATELET # BLD AUTO: 300 10*3/MM3 (ref 140–450)
PMV BLD AUTO: 9 FL (ref 6–12)
POTASSIUM SERPL-SCNC: 4 MMOL/L (ref 3.5–5.2)
PROT SERPL-MCNC: 7.1 G/DL (ref 6–8.5)
PROT UR QL STRIP: NEGATIVE
RBC # BLD AUTO: 4.5 10*6/MM3 (ref 3.77–5.28)
RBC # UR: ABNORMAL /HPF
REF LAB TEST METHOD: ABNORMAL
SODIUM SERPL-SCNC: 138 MMOL/L (ref 136–145)
SP GR UR STRIP: 1.02 (ref 1–1.03)
SQUAMOUS #/AREA URNS HPF: ABNORMAL /HPF
UROBILINOGEN UR QL STRIP: ABNORMAL
WBC # BLD AUTO: 15.35 10*3/MM3 (ref 3.4–10.8)
WBC UR QL AUTO: ABNORMAL /HPF

## 2020-08-02 PROCEDURE — 99284 EMERGENCY DEPT VISIT MOD MDM: CPT

## 2020-08-02 PROCEDURE — 80053 COMPREHEN METABOLIC PANEL: CPT | Performed by: NURSE PRACTITIONER

## 2020-08-02 PROCEDURE — 83690 ASSAY OF LIPASE: CPT | Performed by: NURSE PRACTITIONER

## 2020-08-02 PROCEDURE — 25010000002 KETOROLAC TROMETHAMINE PER 15 MG: Performed by: NURSE PRACTITIONER

## 2020-08-02 PROCEDURE — 74176 CT ABD & PELVIS W/O CONTRAST: CPT

## 2020-08-02 PROCEDURE — 96375 TX/PRO/DX INJ NEW DRUG ADDON: CPT

## 2020-08-02 PROCEDURE — 96374 THER/PROPH/DIAG INJ IV PUSH: CPT

## 2020-08-02 PROCEDURE — 25010000002 ONDANSETRON PER 1 MG: Performed by: NURSE PRACTITIONER

## 2020-08-02 PROCEDURE — 63710000001 ONDANSETRON PER 8 MG: Performed by: EMERGENCY MEDICINE

## 2020-08-02 PROCEDURE — 81001 URINALYSIS AUTO W/SCOPE: CPT | Performed by: NURSE PRACTITIONER

## 2020-08-02 PROCEDURE — 85025 COMPLETE CBC W/AUTO DIFF WBC: CPT | Performed by: NURSE PRACTITIONER

## 2020-08-02 RX ORDER — LEVOFLOXACIN 750 MG/1
750 TABLET ORAL ONCE
Status: COMPLETED | OUTPATIENT
Start: 2020-08-02 | End: 2020-08-02

## 2020-08-02 RX ORDER — KETOROLAC TROMETHAMINE 30 MG/ML
30 INJECTION, SOLUTION INTRAMUSCULAR; INTRAVENOUS ONCE
Status: COMPLETED | OUTPATIENT
Start: 2020-08-02 | End: 2020-08-02

## 2020-08-02 RX ORDER — ONDANSETRON 2 MG/ML
4 INJECTION INTRAMUSCULAR; INTRAVENOUS ONCE
Status: COMPLETED | OUTPATIENT
Start: 2020-08-02 | End: 2020-08-02

## 2020-08-02 RX ORDER — CIPROFLOXACIN 500 MG/1
500 TABLET, FILM COATED ORAL 2 TIMES DAILY
Qty: 20 TABLET | Refills: 0 | Status: SHIPPED | OUTPATIENT
Start: 2020-08-02 | End: 2020-11-17

## 2020-08-02 RX ORDER — HYDROCODONE BITARTRATE AND ACETAMINOPHEN 5; 325 MG/1; MG/1
1 TABLET ORAL EVERY 8 HOURS PRN
Qty: 12 TABLET | Refills: 0 | Status: SHIPPED | OUTPATIENT
Start: 2020-08-02 | End: 2020-11-17

## 2020-08-02 RX ORDER — ONDANSETRON 4 MG/1
4 TABLET, FILM COATED ORAL ONCE
Status: COMPLETED | OUTPATIENT
Start: 2020-08-02 | End: 2020-08-02

## 2020-08-02 RX ORDER — METRONIDAZOLE 500 MG/1
500 TABLET ORAL 2 TIMES DAILY
Qty: 14 TABLET | Refills: 0 | Status: SHIPPED | OUTPATIENT
Start: 2020-08-02 | End: 2021-02-27

## 2020-08-02 RX ORDER — METRONIDAZOLE 500 MG/1
500 TABLET ORAL ONCE
Status: COMPLETED | OUTPATIENT
Start: 2020-08-02 | End: 2020-08-02

## 2020-08-02 RX ORDER — SODIUM CHLORIDE 0.9 % (FLUSH) 0.9 %
10 SYRINGE (ML) INJECTION AS NEEDED
Status: DISCONTINUED | OUTPATIENT
Start: 2020-08-02 | End: 2020-08-02 | Stop reason: HOSPADM

## 2020-08-02 RX ORDER — HYDROCODONE BITARTRATE AND ACETAMINOPHEN 5; 325 MG/1; MG/1
1 TABLET ORAL ONCE
Status: COMPLETED | OUTPATIENT
Start: 2020-08-02 | End: 2020-08-02

## 2020-08-02 RX ORDER — ONDANSETRON 4 MG/1
4 TABLET, ORALLY DISINTEGRATING ORAL EVERY 8 HOURS PRN
Qty: 14 TABLET | Refills: 0 | Status: SHIPPED | OUTPATIENT
Start: 2020-08-02 | End: 2020-11-17

## 2020-08-02 RX ADMIN — ONDANSETRON HYDROCHLORIDE 4 MG: 4 TABLET, FILM COATED ORAL at 19:39

## 2020-08-02 RX ADMIN — ONDANSETRON 4 MG: 2 INJECTION INTRAMUSCULAR; INTRAVENOUS at 17:49

## 2020-08-02 RX ADMIN — HYDROCODONE BITARTRATE AND ACETAMINOPHEN 1 TABLET: 5; 325 TABLET ORAL at 19:39

## 2020-08-02 RX ADMIN — LEVOFLOXACIN 750 MG: 750 TABLET, FILM COATED ORAL at 19:39

## 2020-08-02 RX ADMIN — SODIUM CHLORIDE 1000 ML: 9 INJECTION, SOLUTION INTRAVENOUS at 17:47

## 2020-08-02 RX ADMIN — METRONIDAZOLE 500 MG: 500 TABLET ORAL at 19:39

## 2020-08-02 RX ADMIN — KETOROLAC TROMETHAMINE 30 MG: 30 INJECTION, SOLUTION INTRAMUSCULAR at 17:47

## 2020-08-02 NOTE — ED PROVIDER NOTES
Subjective   Patient presents to the emergency department with complaint of left lower quadrant/pelvis pain onset yesterday followed by pain radiating to her left flank today.  She is experienced no fever, dysuria or hematuria.  She is experienced no nausea or vomiting or diarrhea.  Patient has a Nexplanon and subsequent amenorrhea for approximately 1 year.  Patient denies any vaginal discharge or odor.  She has no history of kidney stones.  Patient presented originally to urgent treatment center locally.  Her provider reported that her urine demonstrated a significant amount of blood.            Review of Systems   Constitutional: Negative for fever.   Gastrointestinal: Positive for abdominal pain. Negative for diarrhea, nausea and vomiting.   Genitourinary: Positive for flank pain and pelvic pain. Negative for dysuria and hematuria. Enuresis: left    Neurological: Negative.    Hematological: Negative.    Psychiatric/Behavioral: Negative.    All other systems reviewed and are negative.      Past Medical History:   Diagnosis Date   • Diverticulitis    • GERD (gastroesophageal reflux disease)    • Hepatitis    • Interstitial cystitis    • Intestinal disease    • UTI (urinary tract infection)        Allergies   Allergen Reactions   • Bactrim [Sulfamethoxazole-Trimethoprim] Rash   • Sulfa Antibiotics Rash       Past Surgical History:   Procedure Laterality Date   • BILATERAL BREAST REDUCTION     • COLONOSCOPY     • WISDOM TOOTH EXTRACTION  2014       Family History   Problem Relation Age of Onset   • Diverticulosis Father    • Hypertension Maternal Grandmother    • Hyperlipidemia Maternal Grandmother    • Arthritis Maternal Grandfather    • Stroke Maternal Grandfather    • Heart attack Maternal Grandfather    • Cancer Other        Social History     Socioeconomic History   • Marital status: Single     Spouse name: Not on file   • Number of children: Not on file   • Years of education: Not on file   • Highest education  level: Not on file   Tobacco Use   • Smoking status: Former Smoker     Years: 5.00     Types: Cigarettes     Last attempt to quit: 10/16/2017     Years since quittin.7   • Smokeless tobacco: Never Used   Substance and Sexual Activity   • Alcohol use: No   • Drug use: Yes     Types: Marijuana   • Sexual activity: Yes     Partners: Male     Birth control/protection: OCP     Comment: Nuva Ring           Objective   Physical Exam   Constitutional: She is oriented to person, place, and time. She appears well-developed and well-nourished.  Non-toxic appearance.   Patient is an excellent historian.  She is in no acute distress.  Her vital signs are normal   HENT:   Head: Normocephalic.   Eyes: Pupils are equal, round, and reactive to light. EOM are normal. No scleral icterus.   Neck: Normal range of motion. Neck supple.   Cardiovascular: Normal rate and regular rhythm.   Pulmonary/Chest: Effort normal and breath sounds normal. She has no wheezes. She has no rales.   Abdominal: Soft. Bowel sounds are normal. She exhibits no distension. There is tenderness in the left lower quadrant. There is no rigidity, no rebound, no guarding and no CVA tenderness.   Musculoskeletal: Normal range of motion. She exhibits no edema.   Neurological: She is alert and oriented to person, place, and time.   Skin: Skin is warm and dry. Capillary refill takes less than 2 seconds.   Psychiatric: She has a normal mood and affect.   Nursing note and vitals reviewed.      Procedures           ED Course  ED Course as of Aug 02 1931   Sun Aug 02, 2020   1759 WBC(!): 15.35 [MS]   1803 Blood, UA(!): Moderate (2+) [MS]   1803 Ketones, UA(!): 15 mg/dL (1+) [MS]   1925 Patient's work-up is remarkable for leukocytosis and sigmoid diverticulitis.  Patient has a known history of diverticular disease diagnosed as early as age 19.  Patient is able to take and tolerate p.o. fluids.  She has no hypotension or tachycardia no fever.  We will initiate outpatient  management.  We have discussed in detail parameters for concern that would warrant return to the emergency department.  Patient understands and concurs with this outpatient plan of care.    [MS]      ED Course User Index  [MS] Mary GregorioJAYNA      Recent Results (from the past 24 hour(s))   POC Urinalysis Dipstick, Multipro (Automated dipstick)    Collection Time: 08/02/20  3:53 PM   Result Value Ref Range    Color Yellow Yellow, Straw, Dark Yellow, Henna    Clarity, UA Clear Clear    Glucose, UA Negative Negative, 1000 mg/dL (3+) mg/dL    Bilirubin Negative Negative    Ketones, UA Trace (A) Negative    Specific Gravity  1.020 1.005 - 1.030    Blood, UA 2+ (A) Negative    pH, Urine 6.0 5.0 - 8.0    Protein, POC Negative Negative mg/dL    Urobilinogen, UA Normal Normal    Nitrite, UA Negative Negative    Leukocytes Negative Negative   Urinalysis With Microscopic If Indicated (No Culture) - Urine, Clean Catch    Collection Time: 08/02/20  5:39 PM   Result Value Ref Range    Color, UA Yellow Yellow, Straw    Appearance, UA Clear Clear    pH, UA <=5.0 5.0 - 8.0    Specific Gravity, UA 1.017 1.005 - 1.030    Glucose, UA Negative Negative    Ketones, UA 15 mg/dL (1+) (A) Negative    Bilirubin, UA Negative Negative    Blood, UA Moderate (2+) (A) Negative    Protein, UA Negative Negative    Leuk Esterase, UA Negative Negative    Nitrite, UA Negative Negative    Urobilinogen, UA 0.2 E.U./dL 0.2 - 1.0 E.U./dL   Urinalysis, Microscopic Only - Urine, Clean Catch    Collection Time: 08/02/20  5:39 PM   Result Value Ref Range    RBC, UA 0-2 (A) None Seen /HPF    WBC, UA 0-2 (A) None Seen /HPF    Bacteria, UA Trace (A) None Seen /HPF    Squamous Epithelial Cells, UA 0-2 None Seen, 0-2 /HPF    Hyaline Casts, UA None Seen None Seen /LPF    Methodology Manual Light Microscopy    Comprehensive Metabolic Panel    Collection Time: 08/02/20  5:45 PM   Result Value Ref Range    Glucose 88 65 - 99 mg/dL    BUN 11 6 - 20 mg/dL     Creatinine 0.90 0.57 - 1.00 mg/dL    Sodium 138 136 - 145 mmol/L    Potassium 4.0 3.5 - 5.2 mmol/L    Chloride 103 98 - 107 mmol/L    CO2 21.7 (L) 22.0 - 29.0 mmol/L    Calcium 9.3 8.6 - 10.5 mg/dL    Total Protein 7.1 6.0 - 8.5 g/dL    Albumin 4.20 3.50 - 5.20 g/dL    ALT (SGPT) 21 1 - 33 U/L    AST (SGOT) 17 1 - 32 U/L    Alkaline Phosphatase 85 39 - 117 U/L    Total Bilirubin 0.9 0.0 - 1.2 mg/dL    eGFR Non African Amer 78 >60 mL/min/1.73    Globulin 2.9 gm/dL    A/G Ratio 1.4 g/dL    BUN/Creatinine Ratio 12.2 7.0 - 25.0    Anion Gap 13.3 5.0 - 15.0 mmol/L   Lipase    Collection Time: 08/02/20  5:45 PM   Result Value Ref Range    Lipase 16 13 - 60 U/L   CBC Auto Differential    Collection Time: 08/02/20  5:45 PM   Result Value Ref Range    WBC 15.35 (H) 3.40 - 10.80 10*3/mm3    RBC 4.50 3.77 - 5.28 10*6/mm3    Hemoglobin 13.4 12.0 - 15.9 g/dL    Hematocrit 38.4 34.0 - 46.6 %    MCV 85.3 79.0 - 97.0 fL    MCH 29.8 26.6 - 33.0 pg    MCHC 34.9 31.5 - 35.7 g/dL    RDW 11.5 (L) 12.3 - 15.4 %    RDW-SD 35.6 (L) 37.0 - 54.0 fl    MPV 9.0 6.0 - 12.0 fL    Platelets 300 140 - 450 10*3/mm3    Neutrophil % 80.1 (H) 42.7 - 76.0 %    Lymphocyte % 10.6 (L) 19.6 - 45.3 %    Monocyte % 8.3 5.0 - 12.0 %    Eosinophil % 0.1 (L) 0.3 - 6.2 %    Basophil % 0.3 0.0 - 1.5 %    Immature Grans % 0.6 (H) 0.0 - 0.5 %    Neutrophils, Absolute 12.30 (H) 1.70 - 7.00 10*3/mm3    Lymphocytes, Absolute 1.62 0.70 - 3.10 10*3/mm3    Monocytes, Absolute 1.28 (H) 0.10 - 0.90 10*3/mm3    Eosinophils, Absolute 0.02 0.00 - 0.40 10*3/mm3    Basophils, Absolute 0.04 0.00 - 0.20 10*3/mm3    Immature Grans, Absolute 0.09 (H) 0.00 - 0.05 10*3/mm3    nRBC 0.0 0.0 - 0.2 /100 WBC     Note: In addition to lab results from this visit, the labs listed above may include labs taken at another facility or during a different encounter within the last 24 hours. Please correlate lab times with ED admission and discharge times for further clarification of the services  performed during this visit.    CT Abdomen Pelvis Without Contrast    (Results Pending)     Vitals:    08/02/20 1840 08/02/20 1845 08/02/20 1850 08/02/20 1900   BP: 99/73   105/74   BP Location:       Patient Position:       Pulse:       Resp:       Temp:       TempSrc:       SpO2: 100% 100% 100% 100%   Weight:       Height:         Medications   sodium chloride 0.9 % flush 10 mL (has no administration in time range)   levoFLOXacin (LEVAQUIN) tablet 750 mg (has no administration in time range)   metroNIDAZOLE (FLAGYL) tablet 500 mg (has no administration in time range)   ondansetron (ZOFRAN) tablet 4 mg (has no administration in time range)   HYDROcodone-acetaminophen (NORCO) 5-325 MG per tablet 1 tablet (has no administration in time range)   ketorolac (TORADOL) injection 30 mg (30 mg Intravenous Given 8/2/20 1747)   ondansetron (ZOFRAN) injection 4 mg (4 mg Intravenous Given 8/2/20 1749)   sodium chloride 0.9 % bolus 1,000 mL (1,000 mL Intravenous New Bag 8/2/20 1747)     ECG/EMG Results (last 24 hours)     ** No results found for the last 24 hours. **        No orders to display                                            MDM    Final diagnoses:   Sigmoid diverticulitis            Mary Gregorio, JAYNA  08/02/20 1931

## 2020-08-02 NOTE — DISCHARGE INSTRUCTIONS
Home to rest.  Maintain your best hydration and nutrition.  Medications as directed.  Follow-up with gastroenterology office at [facility].  Call tomorrow for follow-up appointment.  Return to the emergency department as needed for worsening symptoms or concerns which include, but are not limited to: Fever, intractable pain, or intractable vomiting.  Thank you

## 2020-08-12 ENCOUNTER — TELEMEDICINE (OUTPATIENT)
Dept: GASTROENTEROLOGY | Facility: CLINIC | Age: 25
End: 2020-08-12

## 2020-08-12 DIAGNOSIS — R10.32 LEFT LOWER QUADRANT ABDOMINAL PAIN: Primary | ICD-10-CM

## 2020-08-12 DIAGNOSIS — K57.92 DIVERTICULITIS: ICD-10-CM

## 2020-08-12 PROCEDURE — 99203 OFFICE O/P NEW LOW 30 MIN: CPT | Performed by: INTERNAL MEDICINE

## 2020-08-12 NOTE — PROGRESS NOTES
PCP: Clara Trinh APRN    No chief complaint on file.  Had abdominal pain.    History of Present Illness:   HPI  This was a telehealth vist. The patient consented for a telehealth visit. The video was lost during the history. Ms. Padilla is a 25 yo with a history of abdominal pain that began over a week ago. She went to the ER and was diagnosed with diverticulitis. Ms. Padilla was treated with antibiotics and says the pain has resolved. She reports an episode about 5 years ago while living in [place], [place]. The patient had a colonoscopy about 5 years ago. There is no history of blood in the stool. Ms. Padilla has no diarrhea. There is no weight loss. Ms. Padilla says the pain was sharp and located in the left lower region. No history of Crohn's disease or ulcerative colitis.  Past Medical History:   Diagnosis Date   • Diverticulitis    • GERD (gastroesophageal reflux disease)    • Hepatitis    • Interstitial cystitis    • Intestinal disease    • UTI (urinary tract infection)        Past Surgical History:   Procedure Laterality Date   • BILATERAL BREAST REDUCTION     • COLONOSCOPY     • WISDOM TOOTH EXTRACTION  2014         Current Outpatient Medications:   •  amitriptyline (ELAVIL) 25 MG tablet, Take 1 tablet by mouth every night at bedtime., Disp: 30 tablet, Rfl: 11  •  ciprofloxacin (CIPRO) 500 MG tablet, Take 1 tablet by mouth 2 (Two) Times a Day., Disp: 20 tablet, Rfl: 0  •  HYDROcodone-acetaminophen (NORCO) 5-325 MG per tablet, Take 1 tablet by mouth Every 8 (Eight) Hours As Needed for Moderate Pain ., Disp: 12 tablet, Rfl: 0  •  metroNIDAZOLE (FLAGYL) 500 MG tablet, Take 1 tablet by mouth 2 (Two) Times a Day., Disp: 14 tablet, Rfl: 0  •  Mirabegron ER (MYRBETRIQ) 50 MG tablet sustained-release 24 hour 24 hr tablet, Take 50 mg by mouth Daily., Disp: 30 tablet, Rfl: 11  •  ondansetron ODT (ZOFRAN-ODT) 4 MG disintegrating tablet, Place 1 tablet on the tongue Every 8 (Eight) Hours As Needed for Nausea or  Vomiting., Disp: 14 tablet, Rfl: 0  No current facility-administered medications for this visit.     Allergies   Allergen Reactions   • Bactrim [Sulfamethoxazole-Trimethoprim] Rash   • Sulfa Antibiotics Rash       Family History   Problem Relation Age of Onset   • Diverticulosis Father    • Hypertension Maternal Grandmother    • Hyperlipidemia Maternal Grandmother    • Arthritis Maternal Grandfather    • Stroke Maternal Grandfather    • Heart attack Maternal Grandfather    • Cancer Other        Social History     Socioeconomic History   • Marital status: Single     Spouse name: Not on file   • Number of children: Not on file   • Years of education: Not on file   • Highest education level: Not on file   Tobacco Use   • Smoking status: Former Smoker     Years: 5.00     Types: Cigarettes     Last attempt to quit: 10/16/2017     Years since quittin.8   • Smokeless tobacco: Never Used   Substance and Sexual Activity   • Alcohol use: No   • Drug use: Yes     Types: Marijuana   • Sexual activity: Yes     Partners: Male     Birth control/protection: OCP     Comment: Nuva Ring       Review of Systems   Constitutional: Negative for appetite change, fatigue, fever and unexpected weight change.   HENT: Negative for dental problem, mouth sores, postnasal drip, sneezing, trouble swallowing and voice change.    Eyes: Negative for pain, redness and itching.   Respiratory: Negative for cough, shortness of breath and wheezing.    Cardiovascular: Negative for chest pain, palpitations and leg swelling.   Gastrointestinal: Negative for abdominal distention, abdominal pain, anal bleeding, blood in stool, constipation, diarrhea, nausea, rectal pain and vomiting.   Endocrine: Negative for cold intolerance, heat intolerance, polydipsia and polyuria.   Genitourinary: Negative for dysuria, enuresis, flank pain, hematuria and urgency.   Musculoskeletal: Negative for arthralgias, back pain, joint swelling and myalgias.   Skin: Negative for  color change, pallor and rash.   Allergic/Immunologic: Negative for environmental allergies, food allergies and immunocompromised state.   Neurological: Negative for dizziness, tremors, seizures, facial asymmetry, numbness and headaches.   Psychiatric/Behavioral: Negative for behavioral problems, dysphoric mood, hallucinations and self-injury.       There were no vitals filed for this visit.    Physical Exam    Diagnoses and all orders for this visit:    Left lower quadrant abdominal pain    Diverticulitis    The patient reports a diagnosis of diverticulitis. The differential should include colitis from other etiologies.       Plan: Proceed with colonoscopy.           Obtain a copy of colonoscopy from [facility] in 2015.      This was a video visit for 15 minutes.

## 2020-08-19 DIAGNOSIS — Z12.11 SCREENING FOR COLON CANCER: Primary | ICD-10-CM

## 2020-08-19 RX ORDER — SODIUM, POTASSIUM,MAG SULFATES 17.5-3.13G
1 SOLUTION, RECONSTITUTED, ORAL ORAL TAKE AS DIRECTED
Qty: 2 BOTTLE | Refills: 0 | Status: SHIPPED | OUTPATIENT
Start: 2020-08-19 | End: 2020-11-17

## 2020-08-24 ENCOUNTER — APPOINTMENT (OUTPATIENT)
Dept: PREADMISSION TESTING | Facility: HOSPITAL | Age: 25
End: 2020-08-24

## 2020-08-25 ENCOUNTER — APPOINTMENT (OUTPATIENT)
Dept: PREADMISSION TESTING | Facility: HOSPITAL | Age: 25
End: 2020-08-25

## 2020-08-25 PROCEDURE — C9803 HOPD COVID-19 SPEC COLLECT: HCPCS

## 2020-08-25 PROCEDURE — U0004 COV-19 TEST NON-CDC HGH THRU: HCPCS

## 2020-08-25 PROCEDURE — U0002 COVID-19 LAB TEST NON-CDC: HCPCS

## 2020-08-26 LAB
REF LAB TEST METHOD: NORMAL
SARS-COV-2 RNA RESP QL NAA+PROBE: NOT DETECTED

## 2020-08-27 ENCOUNTER — OUTSIDE FACILITY SERVICE (OUTPATIENT)
Dept: GASTROENTEROLOGY | Facility: CLINIC | Age: 25
End: 2020-08-27

## 2020-08-27 PROCEDURE — 88305 TISSUE EXAM BY PATHOLOGIST: CPT | Performed by: INTERNAL MEDICINE

## 2020-08-27 PROCEDURE — 45380 COLONOSCOPY AND BIOPSY: CPT | Performed by: INTERNAL MEDICINE

## 2020-08-28 ENCOUNTER — LAB REQUISITION (OUTPATIENT)
Dept: LAB | Facility: HOSPITAL | Age: 25
End: 2020-08-28

## 2020-08-28 DIAGNOSIS — R10.30 LOWER ABDOMINAL PAIN, UNSPECIFIED: ICD-10-CM

## 2020-08-31 LAB
CYTO UR: NORMAL
LAB AP CASE REPORT: NORMAL
LAB AP CLINICAL INFORMATION: NORMAL
PATH REPORT.FINAL DX SPEC: NORMAL
PATH REPORT.GROSS SPEC: NORMAL

## 2020-09-01 ENCOUNTER — TELEPHONE (OUTPATIENT)
Dept: GASTROENTEROLOGY | Facility: CLINIC | Age: 25
End: 2020-09-01

## 2020-09-01 NOTE — TELEPHONE ENCOUNTER
----- Message from Srinivasa Ernst MD sent at 8/31/2020  9:58 PM EDT -----  Let Janae know there was no colitis on the biopsies.  She does have diverticulosis.  Thanks,  GMW

## 2020-09-22 DIAGNOSIS — N30.10 IC (INTERSTITIAL CYSTITIS): ICD-10-CM

## 2020-10-13 ENCOUNTER — TELEPHONE (OUTPATIENT)
Dept: URGENT CARE | Facility: CLINIC | Age: 25
End: 2020-10-13

## 2020-10-13 DIAGNOSIS — J06.9 UPPER RESPIRATORY TRACT INFECTION, UNSPECIFIED TYPE: Primary | ICD-10-CM

## 2020-10-13 RX ORDER — ALBUTEROL SULFATE 90 UG/1
2 AEROSOL, METERED RESPIRATORY (INHALATION) EVERY 4 HOURS PRN
Qty: 18 G | Refills: 0 | Status: SHIPPED | OUTPATIENT
Start: 2020-10-13 | End: 2020-11-17

## 2020-10-13 NOTE — TELEPHONE ENCOUNTER
Albuterol inhaler sent to her pharmacy for complaints of shortness of breath. If not relieved RTC or follow up with Clara Trinh her PCP.

## 2020-11-17 ENCOUNTER — OFFICE VISIT (OUTPATIENT)
Dept: OBSTETRICS AND GYNECOLOGY | Facility: CLINIC | Age: 25
End: 2020-11-17

## 2020-11-17 DIAGNOSIS — N89.8 VAGINAL DISCHARGE: ICD-10-CM

## 2020-11-17 DIAGNOSIS — N94.10 DYSPAREUNIA IN FEMALE: Primary | ICD-10-CM

## 2020-11-17 DIAGNOSIS — N89.8 VAGINAL DRYNESS: ICD-10-CM

## 2020-11-17 PROCEDURE — 99214 OFFICE O/P EST MOD 30 MIN: CPT | Performed by: OBSTETRICS & GYNECOLOGY

## 2020-11-17 RX ORDER — ESTRADIOL 1 MG/1
1 TABLET ORAL DAILY
Qty: 30 TABLET | Refills: 1 | Status: SHIPPED | OUTPATIENT
Start: 2020-11-17 | End: 2021-02-27 | Stop reason: SDDI

## 2020-11-17 RX ORDER — ETONOGESTREL 68 MG/1
IMPLANT SUBCUTANEOUS
COMMUNITY
Start: 2019-08-06 | End: 2021-08-25

## 2020-11-18 VITALS
HEIGHT: 60 IN | DIASTOLIC BLOOD PRESSURE: 72 MMHG | WEIGHT: 164 LBS | BODY MASS INDEX: 32.2 KG/M2 | SYSTOLIC BLOOD PRESSURE: 116 MMHG

## 2020-11-20 NOTE — PROGRESS NOTES
Subjective  Chief Complaint   Patient presents with   • pain with intercourse     Patient is 25 y.o.  here for evaluation of pain with intercourse which is been present for the last year.  Patient reports worsening of her symptoms over the last 6 months.  Patient reports she is having pain with both insertion as well as deep penetration.  Patient does report having vaginal dryness as well.  Patient reports she occasionally has been using lubricants with intercourse.  She reports no improvement with her symptoms.  Patient denies any postcoital bleeding.  Patient does report having hot flashes as well.  Patient currently has Nexplanon for contraception.  She has had no bleeding with the Nexplanon.  Patient denies any problems or side effects from her Nexplanon.    History  Past Medical History:   Diagnosis Date   • Diverticulitis    • GERD (gastroesophageal reflux disease)    • Hepatitis    • Interstitial cystitis    • Intestinal disease    • UTI (urinary tract infection)      Current Outpatient Medications on File Prior to Visit   Medication Sig Dispense Refill   • Etonogestrel (Nexplanon) 68 MG implant subdermal implant      • amitriptyline (ELAVIL) 25 MG tablet Take 1 tablet by mouth every night at bedtime. 30 tablet 11   • metroNIDAZOLE (FLAGYL) 500 MG tablet Take 1 tablet by mouth 2 (Two) Times a Day. 14 tablet 0   • Mirabegron ER (MYRBETRIQ) 50 MG tablet sustained-release 24 hour 24 hr tablet Take 50 mg by mouth Daily. 30 tablet 11     Current Facility-Administered Medications on File Prior to Visit   Medication Dose Route Frequency Provider Last Rate Last Admin   • [COMPLETED] dimethyl sulfoxide (DMSO) 50 % solution 50 mL  50 mL Urethral Once Mychal Kennedy MD   50 mL at 18 [id number]     Allergies   Allergen Reactions   • Bactrim [Sulfamethoxazole-Trimethoprim] Rash   • Sulfa Antibiotics Rash     Past Surgical History:   Procedure Laterality Date   • BILATERAL BREAST REDUCTION     • COLONOSCOPY     •  "WISDOM TOOTH EXTRACTION  2014     Family History   Problem Relation Age of Onset   • Diverticulosis Father    • Hypertension Maternal Grandmother    • Hyperlipidemia Maternal Grandmother    • Arthritis Maternal Grandfather    • Stroke Maternal Grandfather    • Heart attack Maternal Grandfather    • Cancer Other      Social History     Socioeconomic History   • Marital status: Single     Spouse name: Not on file   • Number of children: Not on file   • Years of education: Not on file   • Highest education level: Not on file   Tobacco Use   • Smoking status: Former Smoker     Years: 5.00     Types: Cigarettes     Quit date: 10/16/2017     Years since quitting: 3.0   • Smokeless tobacco: Never Used   Substance and Sexual Activity   • Alcohol use: No   • Drug use: Yes     Types: Marijuana   • Sexual activity: Yes     Partners: Male     Birth control/protection: OCP     Comment: Nuva Ring       Review of Systems  All systems were reviewed and negative except for:  Genitourinary: postivie for  pain, painful intercourse and vaginal dryness  Objective  Vitals:    11/17/20 1408   BP: 116/72   Weight: 74.4 kg (164 lb)   Height: 152.4 cm (60\")     Physical Exam:  General Appearance: alert, appears stated age and cooperative  Head: normocephalic, without obvious abnormality and atraumatic  Eyes: lids and lashes normal, conjunctivae and sclerae normal, no icterus, no pallor, corneas clear and PERRLA  Ears: ears appear intact with no abnormalities noted  Nose: nares normal, septum midline, mucosa normal and no drainage  Neck: suppple, trachea midline and no thyromegaly  Lungs: clear to auscultation, respirations regular, respirations even and respirations unlabored  Heart: regular rhythm and normal rate, normal S1, S2, no murmur, gallop, or rubs and no click  Breasts: Not performed.  Abdomen: normal bowel sounds, no masses, no hepatomegaly, no splenomegaly, soft non-tender, no guarding and no rebound tenderness  Pelvic: Clinical " staff was present for exam  External genitalia:  normal appearance of the external genitalia including Bartholin's and [place]'s glands.  :  urethral meatus normal;  Vaginal:  atrophic mucosal changes are present; discharge present -  mucousy and white;  Cervix:  normal appearance.  Uterus:  normal size, shape and consistency.  Adnexa:  normal bimanual exam of the adnexa.  cultures obtained  Extremities: moves extremities well, no edema, no cyanosis and no redness  Skin: no bleeding, bruising or rash and no lesions noted  Lymph Nodes: no palpable adenopathy  Neuro: CN II-X grossly intact; sensation intact  Psych: normal mood and affect, oriented to person, time and place, thought content organized and appropriate judgment  Lab Review   No data reviewed    Imaging   No data reviewed    Decision to Obtain Medical Records  No    Summary of Medical Records  No    Assessment/Plan  Problems Addressed this Visit     None      Visit Diagnoses     Dyspareunia in female    -  Primary new  Discussed with the patient the various etiologies for dyspareunia.  The patient is noted to have a vaginal discharge on examination.  Cultures were obtained today.  I discussed with the patient the option of a trial with vaginal estrogen cream.  Patient declines the cream.  We will plan a trial with estradiol oral tablets as noted.  Instructions and precautions are given.  Patient is to follow-up as discussed.  Patient also to schedule transvaginal ultrasound as well for further evaluation.    Relevant Medications    estradiol (Estrace) 1 MG tablet    Other Relevant Orders    NuSwab VG+ - Swab, Cervix    Vaginal discharge    New  Patient with vaginal discharge noted on examination.  Cultures were obtained today.  Plan pending results.    Relevant Orders    NuSwab VG+ - Swab, Cervix    Vaginal dryness    New  Patient with complaints of vaginal dryness as noted.  I discussed with the patient the use of water-based and silicone based  lubricants.  We will also plan a trial with estradiol as given.  Instructions and precautions have been given.  Plan pending results.    Relevant Medications    estradiol (Estrace) 1 MG tablet      Diagnoses       Codes Comments    Dyspareunia in female    -  Primary ICD-10-CM: N94.10  ICD-9-CM: 625.0     Vaginal discharge     ICD-10-CM: N89.8  ICD-9-CM: 623.5     Vaginal dryness     ICD-10-CM: N89.8  ICD-9-CM: 625.8                Follow up as discussed/scheduled  Note: Speech recognition transcription software may have been used to dictate portions of this document.  An attempt at proofreading has been made though minor errors in transcription may still be present.  This note was electronically signed.  Era Jimenez M.D.

## 2020-11-23 LAB
A VAGINAE DNA VAG QL NAA+PROBE: NORMAL SCORE
BVAB2 DNA VAG QL NAA+PROBE: NORMAL SCORE
C ALBICANS DNA VAG QL NAA+PROBE: NEGATIVE
C GLABRATA DNA VAG QL NAA+PROBE: NEGATIVE
C TRACH DNA VAG QL NAA+PROBE: NEGATIVE
MEGA1 DNA VAG QL NAA+PROBE: NORMAL SCORE
N GONORRHOEA DNA VAG QL NAA+PROBE: NEGATIVE
T VAGINALIS DNA VAG QL NAA+PROBE: NEGATIVE

## 2020-11-30 ENCOUNTER — TELEPHONE (OUTPATIENT)
Dept: OBSTETRICS AND GYNECOLOGY | Facility: CLINIC | Age: 25
End: 2020-11-30

## 2021-01-22 ENCOUNTER — HOSPITAL ENCOUNTER (EMERGENCY)
Facility: HOSPITAL | Age: 26
Discharge: HOME | End: 2021-01-22
Attending: EMERGENCY MEDICINE | Admitting: EMERGENCY MEDICINE

## 2021-01-22 ENCOUNTER — APPOINTMENT (OUTPATIENT)
Dept: CT IMAGING | Facility: HOSPITAL | Age: 26
End: 2021-01-22

## 2021-01-22 ENCOUNTER — LAB (OUTPATIENT)
Dept: LAB | Facility: HOSPITAL | Age: 26
End: 2021-01-22

## 2021-01-22 VITALS
HEART RATE: 79 BPM | DIASTOLIC BLOOD PRESSURE: 68 MMHG | RESPIRATION RATE: 18 BRPM | SYSTOLIC BLOOD PRESSURE: 110 MMHG | TEMPERATURE: 98.9 F | WEIGHT: 166 LBS | BODY MASS INDEX: 32.59 KG/M2 | HEIGHT: 60 IN | OXYGEN SATURATION: 100 %

## 2021-01-22 DIAGNOSIS — S09.90XA CLOSED HEAD INJURY, INITIAL ENCOUNTER: ICD-10-CM

## 2021-01-22 DIAGNOSIS — Z02.6 WORKER'S COMPENSATION CLAIM ADMINISTRATIVE PROBLEM: Primary | ICD-10-CM

## 2021-01-22 DIAGNOSIS — T14.8XXA PUNCTURE WOUND: ICD-10-CM

## 2021-01-22 DIAGNOSIS — S09.93XA FACIAL INJURY, INITIAL ENCOUNTER: Primary | ICD-10-CM

## 2021-01-22 LAB
AMPHET+METHAMPHET UR QL: NEGATIVE
AMPHETAMINES UR QL: NEGATIVE
B-HCG UR QL: NEGATIVE
BARBITURATES UR QL SCN: NEGATIVE
BENZODIAZ UR QL SCN: NEGATIVE
BUPRENORPHINE SERPL-MCNC: NEGATIVE NG/ML
CANNABINOIDS SERPL QL: NEGATIVE
COCAINE UR QL: NEGATIVE
METHADONE UR QL SCN: NEGATIVE
OPIATES UR QL: NEGATIVE
OXYCODONE UR QL SCN: NEGATIVE
PCP UR QL SCN: NEGATIVE
PROPOXYPH UR QL: NEGATIVE
TRICYCLICS UR QL SCN: NEGATIVE

## 2021-01-22 PROCEDURE — 81025 URINE PREGNANCY TEST: CPT | Performed by: PHYSICIAN ASSISTANT

## 2021-01-22 PROCEDURE — 90471 IMMUNIZATION ADMIN: CPT | Performed by: PHYSICIAN ASSISTANT

## 2021-01-22 PROCEDURE — 25010000002 TDAP 5-2.5-18.5 LF-MCG/0.5 SUSPENSION: Performed by: PHYSICIAN ASSISTANT

## 2021-01-22 PROCEDURE — 99283 EMERGENCY DEPT VISIT LOW MDM: CPT

## 2021-01-22 PROCEDURE — 90715 TDAP VACCINE 7 YRS/> IM: CPT | Performed by: PHYSICIAN ASSISTANT

## 2021-01-22 PROCEDURE — 70450 CT HEAD/BRAIN W/O DYE: CPT

## 2021-01-22 PROCEDURE — 80306 DRUG TEST PRSMV INSTRMNT: CPT

## 2021-01-22 RX ADMIN — TETANUS TOXOID, REDUCED DIPHTHERIA TOXOID AND ACELLULAR PERTUSSIS VACCINE, ADSORBED 0.5 ML: 5; 2.5; 8; 8; 2.5 SUSPENSION INTRAMUSCULAR at 16:30

## 2021-01-22 NOTE — ED PROVIDER NOTES
Subjective   Patient is a 25 year old female with history of diverticulitis, hepatitis, GERD, and interstitial cystitis presenting to the ER for evaluation of head trauma.  Patient states she was at work approximately 2.5 hours ago when she struck her forehead against a door latch.  She states there is no loss of consciousness but states that since then she has had a swimmy headed feeling and a headache.  She remembers the entire event, denies any seizure activity after this.  She states that she sustained a small puncture wound to her forehead, bleeding has been controlled.  She denies any vision loss or changes, otorrhea or rhinorrhea, neck pain or stiffness, extremity paresthesias, emesis, or any other symptoms.  Had ibuprofen prior to arrival.           Review of Systems   Constitutional: Negative for chills and fever.   HENT: Negative for congestion, ear pain, nosebleeds, sinus pressure and sore throat.    Eyes: Negative.    Respiratory: Negative.    Cardiovascular: Negative.    Gastrointestinal: Negative.    Genitourinary: Negative.    Musculoskeletal: Negative.    Skin: Negative.    Neurological: Positive for dizziness and headaches.   Psychiatric/Behavioral: Negative.        Past Medical History:   Diagnosis Date   • Diverticulitis    • GERD (gastroesophageal reflux disease)    • Hepatitis    • Interstitial cystitis    • Intestinal disease    • UTI (urinary tract infection)        Allergies   Allergen Reactions   • Bactrim [Sulfamethoxazole-Trimethoprim] Rash   • Sulfa Antibiotics Rash       Past Surgical History:   Procedure Laterality Date   • BILATERAL BREAST REDUCTION     • COLONOSCOPY     • WISDOM TOOTH EXTRACTION  2014       Family History   Problem Relation Age of Onset   • Diverticulosis Father    • Hypertension Maternal Grandmother    • Hyperlipidemia Maternal Grandmother    • Arthritis Maternal Grandfather    • Stroke Maternal Grandfather    • Heart attack Maternal Grandfather    • Cancer Other   "      Social History     Socioeconomic History   • Marital status: Single     Spouse name: Not on file   • Number of children: Not on file   • Years of education: Not on file   • Highest education level: Not on file   Tobacco Use   • Smoking status: Former Smoker     Years: 5.00     Types: Cigarettes     Quit date: 10/16/2017     Years since quitting: 3.2   • Smokeless tobacco: Never Used   Substance and Sexual Activity   • Alcohol use: No   • Drug use: Not Currently     Types: Marijuana   • Sexual activity: Yes     Partners: Male     Birth control/protection: OCP     Comment: Nuva Ring           Objective   Physical Exam  Vitals signs and nursing note reviewed.     /68 (BP Location: Right arm, Patient Position: Sitting)   Pulse 79   Temp 98.9 °F (37.2 °C) (Oral)   Resp 18   Ht 152.4 cm (60\")   Wt 75.3 kg (166 lb)   LMP  (LMP Unknown)   SpO2 100%   BMI 32.42 kg/m²     GEN: No acute distress, sitting upright in stretcher.  Awake and alert.  Is not appear toxic.  Skin: Patient is a very small puncture wound to the forehead, no open gaping of the skin, no obvious foreign body, no bleeding  Head: Normocephalic, small puncture wound to the forehead as noted above.  No specific ecchymosis or edema, no postauricular ecchymosis  Eyes: EOM intact. PERRL  ENT: Mask in place per protocol  Cardiovascular: Regular rate and rhythm  Lungs: Clear to auscultation bilaterally without adventitious sounds  Abdomen: Soft, nontender, nondistended, no peritoneal signs or guarding  Extremities: No edema, normal appearance, full range of motion without deficits  Neuro: GCS 15  Psych: Mood and affect are appropriate    Procedures           ED Course  ED Course as of Jan 22 2115 Fri Jan 22, 2021   1553 HCG, Urine QL: Negative [LA]   1553 PROCEDURE: CT HEAD WO CONTRAST-     HISTORY: Frontal head trauma, headache, dizziness     COMPARISON:  None .     TECHNIQUE: Multiple axial CT images were performed from the foramen  magnum to " the vertex without enhancement.      FINDINGS: There is no CT evidence of hemorrhage. There is no mass, mass  effect or midline shift.  There is no hydrocephalus. The paranasal  sinuses are clear. Bone windows reveal no acute osseous abnormalities.     IMPRESSION:  No acute intracranial process.           982.81 mGy.cm        This study was performed with techniques to keep radiation doses as low  as reasonably achievable (ALARA). Individualized dose reduction  techniques using automated exposure control or adjustment of mA and/or  kV according to the patient size were employed.      This report was finalized on 1/22/2021 3:46 PM by Brad Arreola M.D..    [LA]   1601 Discussed findings with patient.  Discussed strict return precautions and follow-up.  She verbalized understanding and was in agreement with this plan of care    [LA]      ED Course User Index  [LA] Mercedes Vyas PA-C                                           MDM  Number of Diagnoses or Management Options  Closed head injury, initial encounter:   Facial injury, initial encounter:   Puncture wound:   Diagnosis management comments: On arrival, patient is stable.  She is in no acute distress, nontoxic in appearance.  Differential could include puncture wound, skull fracture, intracranial hemorrhage, closed head injury, and other concerns.  Have low concern for intracranial hemorrhage but given the headache and dizziness with this acute injury, discussed options with patient including CT scanning of the head.  She wants this performed, will obtain UPT prior to this.  Patient is unsure when she had her last tetanus, does have a puncture wound from this metal latch, will update here.      CT as read by the radiologist revealed no acute abnormality.  Discussed symptomatic treatment for concussion, wound care, follow-up with her primary care provider and strict return precautions/ She verbalized understanding and was in agreement with this plan of  care.         Amount and/or Complexity of Data Reviewed  Clinical lab tests: reviewed and ordered  Tests in the radiology section of CPT®: reviewed and ordered  Review and summarize past medical records: yes  Discuss the patient with other providers: yes    Risk of Complications, Morbidity, and/or Mortality  Presenting problems: moderate  Diagnostic procedures: moderate  Management options: low    Patient Progress  Patient progress: stable      Final diagnoses:   Facial injury, initial encounter   Closed head injury, initial encounter   Puncture wound            Mercedes Vyas PA-C  01/22/21 7712

## 2021-05-06 NOTE — DISCHARGE INSTRUCTIONS
You likely have a small concussion from your injury.  You may have headache, blurred vision, dizziness, nausea or difficulty concentrating or focusing for days to weeks following your event.  You will need to refrain from any activities that could result in further head injury.  You need to follow-up with your primary care provider in the next few days to reevaluate your symptoms.  If symptoms persist or worsen, they may need to do further outpatient imaging.  Keep wound clean with soap and water.  Return here to the ER for any change, worsening symptoms, or any additional concerns.   No

## 2021-08-03 ENCOUNTER — TELEPHONE (OUTPATIENT)
Dept: GENETICS | Facility: HOSPITAL | Age: 26
End: 2021-08-03

## 2021-08-26 ENCOUNTER — TELEPHONE (OUTPATIENT)
Dept: GASTROENTEROLOGY | Facility: CLINIC | Age: 26
End: 2021-08-26

## 2021-08-26 NOTE — TELEPHONE ENCOUNTER
Dr Ernst,  Ms Randy called and stated that she went to the Urgent care yesterday. The physician prescribed Cipro. I explained to her to that the antibiotic hasn't had time to work. To continue antibiotic. If symptoms worsens; after starting antibiotics after 2 or 3 days to go to the ER. Patient agreed. Please let me know if you want advised if you have a different recommendation for patient. Thanks

## 2021-09-02 ENCOUNTER — LAB (OUTPATIENT)
Dept: LAB | Facility: HOSPITAL | Age: 26
End: 2021-09-02

## 2021-09-02 ENCOUNTER — OFFICE VISIT (OUTPATIENT)
Dept: GASTROENTEROLOGY | Facility: CLINIC | Age: 26
End: 2021-09-02

## 2021-09-02 ENCOUNTER — APPOINTMENT (OUTPATIENT)
Dept: CT IMAGING | Facility: HOSPITAL | Age: 26
End: 2021-09-02

## 2021-09-02 VITALS
SYSTOLIC BLOOD PRESSURE: 112 MMHG | DIASTOLIC BLOOD PRESSURE: 66 MMHG | WEIGHT: 161 LBS | BODY MASS INDEX: 31.61 KG/M2 | HEIGHT: 60 IN | TEMPERATURE: 98.7 F | HEART RATE: 105 BPM | OXYGEN SATURATION: 99 %

## 2021-09-02 DIAGNOSIS — K92.1 HEMATOCHEZIA: ICD-10-CM

## 2021-09-02 DIAGNOSIS — K57.92 DIVERTICULITIS: Primary | ICD-10-CM

## 2021-09-02 DIAGNOSIS — K57.92 DIVERTICULITIS: ICD-10-CM

## 2021-09-02 LAB
ALBUMIN SERPL-MCNC: 4.2 G/DL (ref 3.5–5.2)
ALBUMIN/GLOB SERPL: 1.4 G/DL
ALP SERPL-CCNC: 57 U/L (ref 39–117)
ALT SERPL W P-5'-P-CCNC: 12 U/L (ref 1–33)
ANION GAP SERPL CALCULATED.3IONS-SCNC: 9.1 MMOL/L (ref 5–15)
AST SERPL-CCNC: 18 U/L (ref 1–32)
BASOPHILS # BLD AUTO: 0.01 10*3/MM3 (ref 0–0.2)
BASOPHILS NFR BLD AUTO: 0.2 % (ref 0–1.5)
BILIRUB SERPL-MCNC: 0.3 MG/DL (ref 0–1.2)
BUN SERPL-MCNC: 7 MG/DL (ref 6–20)
BUN/CREAT SERPL: 6.6 (ref 7–25)
CALCIUM SPEC-SCNC: 9.2 MG/DL (ref 8.6–10.5)
CHLORIDE SERPL-SCNC: 106 MMOL/L (ref 98–107)
CO2 SERPL-SCNC: 23.9 MMOL/L (ref 22–29)
CREAT SERPL-MCNC: 1.06 MG/DL (ref 0.57–1)
DEPRECATED RDW RBC AUTO: 33.5 FL (ref 37–54)
EOSINOPHIL # BLD AUTO: 0.03 10*3/MM3 (ref 0–0.4)
EOSINOPHIL NFR BLD AUTO: 0.7 % (ref 0.3–6.2)
ERYTHROCYTE [DISTWIDTH] IN BLOOD BY AUTOMATED COUNT: 10.7 % (ref 12.3–15.4)
GFR SERPL CREATININE-BSD FRML MDRD: 64 ML/MIN/1.73
GLOBULIN UR ELPH-MCNC: 2.9 GM/DL
GLUCOSE SERPL-MCNC: 91 MG/DL (ref 65–99)
HCT VFR BLD AUTO: 38.2 % (ref 34–46.6)
HGB BLD-MCNC: 13 G/DL (ref 12–15.9)
IMM GRANULOCYTES # BLD AUTO: 0.01 10*3/MM3 (ref 0–0.05)
IMM GRANULOCYTES NFR BLD AUTO: 0.2 % (ref 0–0.5)
LYMPHOCYTES # BLD AUTO: 1.63 10*3/MM3 (ref 0.7–3.1)
LYMPHOCYTES NFR BLD AUTO: 39 % (ref 19.6–45.3)
MCH RBC QN AUTO: 29.4 PG (ref 26.6–33)
MCHC RBC AUTO-ENTMCNC: 34 G/DL (ref 31.5–35.7)
MCV RBC AUTO: 86.4 FL (ref 79–97)
MONOCYTES # BLD AUTO: 0.36 10*3/MM3 (ref 0.1–0.9)
MONOCYTES NFR BLD AUTO: 8.6 % (ref 5–12)
NEUTROPHILS NFR BLD AUTO: 2.14 10*3/MM3 (ref 1.7–7)
NEUTROPHILS NFR BLD AUTO: 51.3 % (ref 42.7–76)
NRBC BLD AUTO-RTO: 0 /100 WBC (ref 0–0.2)
PLATELET # BLD AUTO: 349 10*3/MM3 (ref 140–450)
PMV BLD AUTO: 10.3 FL (ref 6–12)
POTASSIUM SERPL-SCNC: 4.2 MMOL/L (ref 3.5–5.2)
PROT SERPL-MCNC: 7.1 G/DL (ref 6–8.5)
RBC # BLD AUTO: 4.42 10*6/MM3 (ref 3.77–5.28)
SODIUM SERPL-SCNC: 139 MMOL/L (ref 136–145)
WBC # BLD AUTO: 4.18 10*3/MM3 (ref 3.4–10.8)

## 2021-09-02 PROCEDURE — 99214 OFFICE O/P EST MOD 30 MIN: CPT | Performed by: NURSE PRACTITIONER

## 2021-09-02 PROCEDURE — 80053 COMPREHEN METABOLIC PANEL: CPT

## 2021-09-02 PROCEDURE — 85025 COMPLETE CBC W/AUTO DIFF WBC: CPT

## 2021-09-02 PROCEDURE — 36415 COLL VENOUS BLD VENIPUNCTURE: CPT

## 2021-09-02 RX ORDER — CIPROFLOXACIN 250 MG/1
TABLET, FILM COATED ORAL
COMMUNITY
Start: 2021-08-31 | End: 2021-11-09

## 2021-09-02 RX ORDER — AMOXICILLIN AND CLAVULANATE POTASSIUM 875; 125 MG/1; MG/1
1 TABLET, FILM COATED ORAL 2 TIMES DAILY
Qty: 20 TABLET | Refills: 0 | Status: SHIPPED | OUTPATIENT
Start: 2021-09-02 | End: 2021-09-12

## 2021-09-02 RX ORDER — ONDANSETRON 4 MG/1
4 TABLET, ORALLY DISINTEGRATING ORAL EVERY 8 HOURS PRN
Qty: 15 TABLET | Refills: 0 | OUTPATIENT
Start: 2021-09-02 | End: 2021-11-09

## 2021-09-02 NOTE — PROGRESS NOTES
Follow Up      Patient Name: Janae Padilla  : 1995   MRN: [id number]     Chief Complaint:    Chief Complaint   Patient presents with   • Follow-up     Diverticulitis       History of Present Illness: Janae Padilla is a 25 y.o. female who is here today for follow up on diverticulitis.  Janae is here for acute left lower quadrant pain for the past 8 days.  She was diagnosed with diverticulitis at urgent care last week.  This makes her third diverticulitis episode in the past 12 to 18 months.  Recurrent diverticulitis. This is her third time in this last 12-18 months.  . She is experiencing LLQ tenderness, loose and liquid BMS, blood in stool, and nausea. There is no fever.  Her appetite waxes and wanes.  She was started on cipro- she has been on this now since day 1 and is not having improvement of her symptoms.  She has been following a liquid diet. There is no rash or painful urination. She denies change of pregnancy.      She reports a strong family history on both sides of diverticulitis.  SCANNED - COLONOSCOPY (2020)    Subjective      Review of Systems:   Review of Systems   Constitutional: Positive for appetite change. Negative for unexpected weight loss.   HENT: Negative for trouble swallowing.    Gastrointestinal: Positive for abdominal pain, blood in stool, diarrhea and nausea. Negative for abdominal distention, anal bleeding, constipation, rectal pain, vomiting, GERD and indigestion.       Medications:     Current Outpatient Medications:   •  amitriptyline (ELAVIL) 25 MG tablet, Take 1 tablet by mouth every night at bedtime., Disp: 30 tablet, Rfl: 11  •  amoxicillin-clavulanate (Augmentin) 875-125 MG per tablet, Take 1 tablet by mouth 2 (Two) Times a Day for 10 days., Disp: 20 tablet, Rfl: 0  •  ciprofloxacin (CIPRO) 250 MG tablet, , Disp: , Rfl:   •  etonogestrel-ethinyl estradiol (NUVARING) 0.12-0.015 MG/24HR vaginal ring, Insert 1 ring into vagina one time a month for 3 weeks then  "remove for 1 week, Disp: , Rfl:   •  Mirabegron ER (MYRBETRIQ) 50 MG tablet sustained-release 24 hour 24 hr tablet, Take 50 mg by mouth Daily., Disp: 30 tablet, Rfl: 11  •  ondansetron ODT (Zofran ODT) 4 MG disintegrating tablet, Place 1 tablet on the tongue Every 8 (Eight) Hours As Needed for Nausea or Vomiting., Disp: 15 tablet, Rfl: 0  No current facility-administered medications for this visit.    Allergies:   Allergies   Allergen Reactions   • Bactrim [Sulfamethoxazole-Trimethoprim] Rash   • Sulfa Antibiotics Rash       Social History:   Social History     Socioeconomic History   • Marital status: Single     Spouse name: Not on file   • Number of children: Not on file   • Years of education: Not on file   • Highest education level: Not on file   Tobacco Use   • Smoking status: Former Smoker     Years: 5.00     Types: Cigarettes     Quit date: 10/16/2017     Years since quitting: 3.8   • Smokeless tobacco: Never Used   Vaping Use   • Vaping Use: Never used   Substance and Sexual Activity   • Alcohol use: No   • Drug use: Not Currently     Types: Marijuana   • Sexual activity: Yes     Partners: Male     Birth control/protection: OCP     Comment: Nuva Ring        Surgical History:   Past Surgical History:   Procedure Laterality Date   • BILATERAL BREAST REDUCTION     • COLONOSCOPY     • WISDOM TOOTH EXTRACTION  2014        Medical History:   Past Medical History:   Diagnosis Date   • Diverticulitis    • GERD (gastroesophageal reflux disease)    • Hepatitis    • Interstitial cystitis    • Intestinal disease    • UTI (urinary tract infection)         Objective     Physical Exam:  Vital Signs:   Vitals:    09/02/21 1037   BP: 112/66   BP Location: Left arm   Patient Position: Sitting   Cuff Size: Adult   Pulse: 105   Temp: 98.7 °F (37.1 °C)   TempSrc: Temporal   SpO2: 99%   Weight: 73 kg (161 lb)   Height: 152.4 cm (60\")     Body mass index is 31.44 kg/m².     Physical Exam  Vitals and nursing note reviewed. "   Constitutional:       General: She is not in acute distress.     Appearance: She is well-developed. She is not diaphoretic.   Eyes:      General: No scleral icterus.     Extraocular Movements:      Right eye: No nystagmus.      Left eye: No nystagmus.      Conjunctiva/sclera: Conjunctivae normal.      Pupils: Pupils are equal, round, and reactive to light.   Neck:      Thyroid: No thyromegaly.   Cardiovascular:      Rate and Rhythm: Normal rate and regular rhythm.   Pulmonary:      Effort: Pulmonary effort is normal.      Breath sounds: Normal breath sounds.   Abdominal:      General: Bowel sounds are normal. There is no distension. There are no signs of injury.      Palpations: Abdomen is soft. There is no hepatomegaly or splenomegaly.      Tenderness: There is abdominal tenderness in the suprapubic area and left lower quadrant. There is no guarding or rebound.      Hernia: No hernia is present.   Musculoskeletal:      Cervical back: Neck supple.      Right lower leg: No edema.      Left lower leg: No edema.   Skin:     General: Skin is warm and dry.      Capillary Refill: Capillary refill takes 2 to 3 seconds.      Coloration: Skin is not jaundiced or pale.      Findings: No bruising or petechiae.      Nails: There is no clubbing.   Neurological:      Mental Status: She is alert and oriented to person, place, and time.   Psychiatric:         Behavior: Behavior normal.         Thought Content: Thought content normal.         Judgment: Judgment normal.         Assessment / Plan      Assessment/Plan:   Diagnoses and all orders for this visit:    1. Diverticulitis (Primary)  -     Gastrointestinal Panel, PCR - Stool, Per Rectum; Future  -     CBC & Differential; Future  -     Comprehensive Metabolic Panel; Future  -     CT Abdomen Pelvis Without Contrast; Future  -     ondansetron ODT (Zofran ODT) 4 MG disintegrating tablet; Place 1 tablet on the tongue Every 8 (Eight) Hours As Needed for Nausea or Vomiting.   Dispense: 15 tablet; Refill: 0  -     amoxicillin-clavulanate (Augmentin) 875-125 MG per tablet; Take 1 tablet by mouth 2 (Two) Times a Day for 10 days.  Dispense: 20 tablet; Refill: 0    2. Hematochezia  -     Gastrointestinal Panel, PCR - Stool, Per Rectum; Future  -     CBC & Differential; Future  -     Comprehensive Metabolic Panel; Future  -     CT Abdomen Pelvis Without Contrast; Future       Will obtain stat CT abdomen, labs, and GI panel.  We will add Augmentin to her Cipro.  She is allergic to Bactrim and intolerant to Flagyl.  Will place back on clear liquid diet.    Consider referral to colorectal surgery for consult in near future given short interval between episodes, onset in early age, and significant family history of diverticulitis.  Follow Up:   No follow-ups on file.  To be determined by labs and imaging    Plan of care reviewed with the patient at the conclusion of today's visit.  Education was provided regarding diagnosis, management, and any prescribed or recommended OTC medications.  Patient verbalized understanding of and agreement with management plan.       JAYNA Montes De Oca  [facility]

## 2021-09-03 ENCOUNTER — HOSPITAL ENCOUNTER (OUTPATIENT)
Dept: CT IMAGING | Facility: HOSPITAL | Age: 26
Discharge: HOME | End: 2021-09-03
Admitting: NURSE PRACTITIONER

## 2021-09-03 DIAGNOSIS — K92.1 HEMATOCHEZIA: ICD-10-CM

## 2021-09-03 DIAGNOSIS — K57.92 DIVERTICULITIS: ICD-10-CM

## 2021-09-03 PROCEDURE — 74176 CT ABD & PELVIS W/O CONTRAST: CPT

## 2021-09-03 PROCEDURE — 0 DIATRIZOATE MEGLUMINE & SODIUM PER 1 ML: Performed by: NURSE PRACTITIONER

## 2021-09-03 RX ADMIN — DIATRIZOATE MEGLUMINE AND DIATRIZOATE SODIUM 15 ML: 660; 100 LIQUID ORAL; RECTAL at 10:02

## 2021-09-04 LAB

## 2021-09-04 PROCEDURE — 0097U HC BIOFIRE FILMARRAY GI PANEL: CPT | Performed by: NURSE PRACTITIONER

## 2021-09-07 ENCOUNTER — APPOINTMENT (OUTPATIENT)
Dept: CT IMAGING | Facility: HOSPITAL | Age: 26
End: 2021-09-07

## 2021-09-08 ENCOUNTER — TELEPHONE (OUTPATIENT)
Dept: GASTROENTEROLOGY | Facility: CLINIC | Age: 26
End: 2021-09-08

## 2021-09-08 DIAGNOSIS — K57.92 DIVERTICULITIS: Primary | ICD-10-CM

## 2021-09-08 NOTE — TELEPHONE ENCOUNTER
Spoke with patient regarding CT results.  She has had improvement of her symptoms.  She is open to speaking with a colorectal surgeon given the frequency of her diverticulitis episodes.  Advised surgery not necessarily needed but consult may be a good idea.  She is agreeable to this and denies further questions at this time

## 2021-11-02 DIAGNOSIS — N30.10 IC (INTERSTITIAL CYSTITIS): ICD-10-CM

## 2021-11-02 RX ORDER — AMITRIPTYLINE HYDROCHLORIDE 25 MG/1
25 TABLET, FILM COATED ORAL
Qty: 30 TABLET | Refills: 11 | Status: SHIPPED | OUTPATIENT
Start: 2021-11-02

## 2021-11-09 ENCOUNTER — HOSPITAL ENCOUNTER (EMERGENCY)
Facility: HOSPITAL | Age: 26
Discharge: HOME | End: 2021-11-09
Attending: EMERGENCY MEDICINE | Admitting: EMERGENCY MEDICINE

## 2021-11-09 ENCOUNTER — APPOINTMENT (OUTPATIENT)
Dept: CT IMAGING | Facility: HOSPITAL | Age: 26
End: 2021-11-09

## 2021-11-09 VITALS
WEIGHT: 168 LBS | TEMPERATURE: 99.2 F | HEIGHT: 61 IN | HEART RATE: 81 BPM | OXYGEN SATURATION: 98 % | SYSTOLIC BLOOD PRESSURE: 110 MMHG | DIASTOLIC BLOOD PRESSURE: 64 MMHG | RESPIRATION RATE: 16 BRPM | BODY MASS INDEX: 31.72 KG/M2

## 2021-11-09 DIAGNOSIS — R10.10 UPPER ABDOMINAL PAIN: Primary | ICD-10-CM

## 2021-11-09 DIAGNOSIS — R11.0 NAUSEA: ICD-10-CM

## 2021-11-09 DIAGNOSIS — R10.13 EPIGASTRIC ABDOMINAL PAIN: ICD-10-CM

## 2021-11-09 LAB
ALBUMIN SERPL-MCNC: 4.1 G/DL (ref 3.5–5.2)
ALBUMIN/GLOB SERPL: 1.5 G/DL
ALP SERPL-CCNC: 53 U/L (ref 39–117)
ALT SERPL W P-5'-P-CCNC: 9 U/L (ref 1–33)
ANION GAP SERPL CALCULATED.3IONS-SCNC: 10.6 MMOL/L (ref 5–15)
AST SERPL-CCNC: 13 U/L (ref 1–32)
B-HCG UR QL: NEGATIVE
BACTERIA UR QL AUTO: ABNORMAL /HPF
BASOPHILS # BLD AUTO: 0.04 10*3/MM3 (ref 0–0.2)
BASOPHILS NFR BLD AUTO: 0.5 % (ref 0–1.5)
BILIRUB SERPL-MCNC: 0.6 MG/DL (ref 0–1.2)
BILIRUB UR QL STRIP: NEGATIVE
BUN SERPL-MCNC: 14 MG/DL (ref 6–20)
BUN/CREAT SERPL: 16.1 (ref 7–25)
CALCIUM SPEC-SCNC: 8.8 MG/DL (ref 8.6–10.5)
CHLORIDE SERPL-SCNC: 102 MMOL/L (ref 98–107)
CLARITY UR: CLEAR
CO2 SERPL-SCNC: 21.4 MMOL/L (ref 22–29)
COLOR UR: ABNORMAL
CREAT SERPL-MCNC: 0.87 MG/DL (ref 0.57–1)
DEPRECATED RDW RBC AUTO: 35.5 FL (ref 37–54)
EOSINOPHIL # BLD AUTO: 0.03 10*3/MM3 (ref 0–0.4)
EOSINOPHIL NFR BLD AUTO: 0.4 % (ref 0.3–6.2)
ERYTHROCYTE [DISTWIDTH] IN BLOOD BY AUTOMATED COUNT: 11.5 % (ref 12.3–15.4)
GFR SERPL CREATININE-BSD FRML MDRD: 79 ML/MIN/1.73
GLOBULIN UR ELPH-MCNC: 2.8 GM/DL
GLUCOSE SERPL-MCNC: 92 MG/DL (ref 65–99)
GLUCOSE UR STRIP-MCNC: NEGATIVE MG/DL
HCT VFR BLD AUTO: 38.4 % (ref 34–46.6)
HGB BLD-MCNC: 13.3 G/DL (ref 12–15.9)
HGB UR QL STRIP.AUTO: ABNORMAL
HOLD SPECIMEN: NORMAL
HOLD SPECIMEN: NORMAL
HYALINE CASTS UR QL AUTO: ABNORMAL /LPF
IMM GRANULOCYTES # BLD AUTO: 0.02 10*3/MM3 (ref 0–0.05)
IMM GRANULOCYTES NFR BLD AUTO: 0.2 % (ref 0–0.5)
KETONES UR QL STRIP: ABNORMAL
LEUKOCYTE ESTERASE UR QL STRIP.AUTO: NEGATIVE
LIPASE SERPL-CCNC: 29 U/L (ref 13–60)
LYMPHOCYTES # BLD AUTO: 0.89 10*3/MM3 (ref 0.7–3.1)
LYMPHOCYTES NFR BLD AUTO: 10.5 % (ref 19.6–45.3)
MCH RBC QN AUTO: 29.4 PG (ref 26.6–33)
MCHC RBC AUTO-ENTMCNC: 34.6 G/DL (ref 31.5–35.7)
MCV RBC AUTO: 85 FL (ref 79–97)
MONOCYTES # BLD AUTO: 0.7 10*3/MM3 (ref 0.1–0.9)
MONOCYTES NFR BLD AUTO: 8.2 % (ref 5–12)
MUCOUS THREADS URNS QL MICRO: ABNORMAL /HPF
NEUTROPHILS NFR BLD AUTO: 6.83 10*3/MM3 (ref 1.7–7)
NEUTROPHILS NFR BLD AUTO: 80.2 % (ref 42.7–76)
NITRITE UR QL STRIP: NEGATIVE
NRBC BLD AUTO-RTO: 0 /100 WBC (ref 0–0.2)
PH UR STRIP.AUTO: 5.5 [PH] (ref 5–8)
PLATELET # BLD AUTO: 292 10*3/MM3 (ref 140–450)
PMV BLD AUTO: 8.8 FL (ref 6–12)
POTASSIUM SERPL-SCNC: 3.7 MMOL/L (ref 3.5–5.2)
PROT SERPL-MCNC: 6.9 G/DL (ref 6–8.5)
PROT UR QL STRIP: ABNORMAL
RBC # BLD AUTO: 4.52 10*6/MM3 (ref 3.77–5.28)
RBC # UR: ABNORMAL /HPF
REF LAB TEST METHOD: ABNORMAL
SODIUM SERPL-SCNC: 134 MMOL/L (ref 136–145)
SP GR UR STRIP: >=1.03 (ref 1–1.03)
SQUAMOUS #/AREA URNS HPF: ABNORMAL /HPF
UROBILINOGEN UR QL STRIP: ABNORMAL
WBC # BLD AUTO: 8.51 10*3/MM3 (ref 3.4–10.8)
WBC UR QL AUTO: ABNORMAL /HPF
WHOLE BLOOD HOLD SPECIMEN: NORMAL
WHOLE BLOOD HOLD SPECIMEN: NORMAL

## 2021-11-09 PROCEDURE — 74177 CT ABD & PELVIS W/CONTRAST: CPT

## 2021-11-09 PROCEDURE — 96374 THER/PROPH/DIAG INJ IV PUSH: CPT

## 2021-11-09 PROCEDURE — 25010000002 IOPAMIDOL 61 % SOLUTION: Performed by: EMERGENCY MEDICINE

## 2021-11-09 PROCEDURE — 99283 EMERGENCY DEPT VISIT LOW MDM: CPT

## 2021-11-09 PROCEDURE — 83690 ASSAY OF LIPASE: CPT | Performed by: EMERGENCY MEDICINE

## 2021-11-09 PROCEDURE — 85025 COMPLETE CBC W/AUTO DIFF WBC: CPT | Performed by: EMERGENCY MEDICINE

## 2021-11-09 PROCEDURE — 81025 URINE PREGNANCY TEST: CPT | Performed by: EMERGENCY MEDICINE

## 2021-11-09 PROCEDURE — 36415 COLL VENOUS BLD VENIPUNCTURE: CPT

## 2021-11-09 PROCEDURE — 81001 URINALYSIS AUTO W/SCOPE: CPT | Performed by: EMERGENCY MEDICINE

## 2021-11-09 PROCEDURE — 25010000002 ONDANSETRON PER 1 MG: Performed by: NURSE PRACTITIONER

## 2021-11-09 PROCEDURE — 80053 COMPREHEN METABOLIC PANEL: CPT | Performed by: EMERGENCY MEDICINE

## 2021-11-09 RX ORDER — FAMOTIDINE 20 MG/1
20 TABLET, FILM COATED ORAL NIGHTLY
Qty: 10 TABLET | Refills: 0 | Status: SHIPPED | OUTPATIENT
Start: 2021-11-09 | End: 2021-12-01

## 2021-11-09 RX ORDER — ONDANSETRON 4 MG/1
4 TABLET, ORALLY DISINTEGRATING ORAL EVERY 6 HOURS PRN
Qty: 12 TABLET | Refills: 0 | OUTPATIENT
Start: 2021-11-09 | End: 2022-08-17

## 2021-11-09 RX ORDER — ONDANSETRON 2 MG/ML
4 INJECTION INTRAMUSCULAR; INTRAVENOUS ONCE
Status: COMPLETED | OUTPATIENT
Start: 2021-11-09 | End: 2021-11-09

## 2021-11-09 RX ORDER — SODIUM CHLORIDE 0.9 % (FLUSH) 0.9 %
10 SYRINGE (ML) INJECTION AS NEEDED
Status: DISCONTINUED | OUTPATIENT
Start: 2021-11-09 | End: 2021-11-10 | Stop reason: HOSPADM

## 2021-11-09 RX ADMIN — SODIUM CHLORIDE 1000 ML: 9 INJECTION, SOLUTION INTRAVENOUS at 20:35

## 2021-11-09 RX ADMIN — IOPAMIDOL 100 ML: 612 INJECTION, SOLUTION INTRAVENOUS at 20:15

## 2021-11-09 RX ADMIN — ONDANSETRON 4 MG: 2 INJECTION INTRAMUSCULAR; INTRAVENOUS at 20:35

## 2021-11-10 ENCOUNTER — LAB (OUTPATIENT)
Dept: LAB | Facility: HOSPITAL | Age: 26
End: 2021-11-10

## 2021-11-10 ENCOUNTER — CLINICAL SUPPORT (OUTPATIENT)
Dept: GENETICS | Facility: HOSPITAL | Age: 26
End: 2021-11-10

## 2021-11-10 DIAGNOSIS — Z13.79 GENETIC TESTING: ICD-10-CM

## 2021-11-10 DIAGNOSIS — Z13.79 GENETIC TESTING: Primary | ICD-10-CM

## 2021-11-10 DIAGNOSIS — Z80.3 FAMILY HISTORY OF BREAST CANCER: Primary | ICD-10-CM

## 2021-11-10 PROCEDURE — 96040: CPT | Performed by: GENETIC COUNSELOR, MS

## 2021-11-10 NOTE — PROGRESS NOTES
Janae Padilla, a 26-year-old female, was referred for genetic counseling due to a family history of breast cancer and reported genetic test results in maternal relatives.   Ms. Padilla was 11 years old at menarche and is nulliparous.  She is premenopausal and retains her uterus and ovaries.  She has not had any breast imaging in the past, but has had a breast reduction.  She was interested in discussing her risk for a hereditary cancer syndrome.  Ms. Padilla was interested in pursuing comprehensive genetic testing to evaluate her risk of cancer, therefore the CancerNext panel was ordered through [facility], which analyzes BRCA1/2 and 34 additional genes associated with hereditary cancer risk. Results are expected in approximately 2-3 weeks.      PERTINENT FAMILY HISTORY: (See attached pedigree)   Mat. Aunt:  Breast cancer, 61  Mat. Grandfather: Prostate cancer, 70s  Mat. 1st cousins (x2): Report positive genetic testing, specific gene not known and copy of results not available for review.       RISK ASSESSMENT:  Ms. Padilla’s family history of breast cancer as well as reported positive genetic testing in a relative raised the question of a hereditary cancer syndrome. When a copy of a relative’s genetic test result is not available, or other affected relatives have not had testing, comprehensive testing is appropriate.   Based on the presence of other clinically significant breast cancer related genes, the standard approach to hereditary cancer genetic testing at this time is via multi-gene panel, which evaluates for BRCA1/2 as well as several additional genes associated with a hereditary risk for breast cancer and other cancers.      GENETIC COUNSELING (35 minutes):  We reviewed the family history information in detail. Cases of cancer follow three general patterns: sporadic, familial, and hereditary.  While most cancer is sporadic, some cases appear to occur in family clusters.  These cases are said to be  familial and account for 10-20% of cancer cases.  Familial cases may be due to a combination of shared genes and environmental factors among family members.  In even fewer cases (5-10%), the risk for cancer is inherited, and the genes responsible for the increased cancer risk are known.       Family histories typical of hereditary cancer syndromes usually include multiple first- and second-degree relatives diagnosed with cancer types that define a syndrome.  These cases tend to be diagnosed at younger-than-expected ages and can be bilateral or multifocal.  The cancer in these families follows an autosomal dominant inheritance pattern, which indicates the likely presence of a mutation in a cancer susceptibility gene.  Children and siblings of an individual believed to carry this mutation have a 50% chance of inheriting that mutation, thereby inheriting the increased risk to develop cancer.  These mutations can be passed down from the maternal or the paternal lineage.     Hereditary breast and ovarian cancer accounts for 5-10% of all cases of breast cancer or ovarian cancer.  A significant proportion (50%) of hereditary breast, ovarian, or pancreatic cancer can be attributed to mutations in the BRCA1 and BRCA2 genes.  Mutations in these genes confer an increased risk for breast cancer, ovarian cancer, male breast cancer, prostate cancer, and pancreatic cancer.  Women with a BRCA1 or BRCA2 mutation have up to an 87% lifetime risk of breast cancer and up to a 44% risk of ovarian cancer.  There are other clinically significant breast cancer related genes in addition to BRCA1/2, including PALB2, AVIS, and CHEK2.  We discussed the varying degrees of risk and associated screening/risk reduction recommendations that could be made based on those risks.  There are also other, more rare, hereditary cancer syndromes. Based on Ms. Padilla’s family history and her desire to get more information regarding her personal risks, she  opted to pursue testing through a panel evaluating several other genes known to increase the risk for cancer.     GENETIC TESTING:  The risks, benefits, and limitations of genetic testing and implications for clinical management following testing were reviewed.  DNA test results can influence decisions regarding screening and prevention.  Genetic testing can have significant psychological implications for both individuals and families. Also discussed was the possibility of employment and insurance discrimination based on genetic test results and the laws in place to prevent this, as well as the limitations of these laws.       We discussed panel testing, which would involve testing 36 genes associated with increased cancer risk. The implications of a positive or negative test result were discussed.  We also discussed the importance of testing on an affected relative. In general, a negative genetic test result is most informative if a mutation has first been established in an affected member of the family.  In cases where an affected individual is not available or interested in testing, it is appropriate to offer testing to an unaffected individual. We discussed the possibility that, in some cases, genetic test results may be ambiguous due to the identification of a genetic variant. These variants may or may not be associated with an increased cancer risk. With multigene panel testing, it is not uncommon for a variant of uncertain significance (VUS) to be identified.  If a VUS is identified, testing family members is typically not recommended and screening recommendations are made based on the family history.  The laboratories that perform genetic testing work to reclassify the VUS and send out an amended report if and when a VUS is reclassified.  The majority of variant findings are ultimately reclassified to a negative result. Given her family history, a negative test result does not eliminate all cancer risk,  although the risk would not be as high as it would with positive genetic testing.     PLAN: Genetic testing via the CancerNext panel through [facility] was ordered and results are expected in 2-3 weeks.   We will contact Ms. Rorygene with her results once they are received.      Concha Reyes MS, [facility] [place]  Licensed Certified Genetic Counselor   none

## 2021-11-10 NOTE — ED PROVIDER NOTES
Subjective   Chief Complaint: Abdominal pain vomiting and nausea    History of Present Illness: This is a 26-year-old female patient comes into the ED today complaining of abdominal pain with nausea and vomiting.  Patient states this started approximately 6:30 AM on 2021 and is progressively worsened throughout the day.  Patient denies any cough, congestion and is up-to-date on COVID-19 vaccinations.  She states she has a history of diverticulosis.      Nurses Notes reviewed and agree, including vitals, allergies, social history and prior medical history.                Review of Systems   Gastrointestinal: Positive for abdominal pain, nausea and vomiting.   All other systems reviewed and are negative.      Past Medical History:   Diagnosis Date   • Diverticulitis    • GERD (gastroesophageal reflux disease)    • Hepatitis    • Interstitial cystitis    • Intestinal disease    • UTI (urinary tract infection)        Allergies   Allergen Reactions   • Bactrim [Sulfamethoxazole-Trimethoprim] Rash   • Sulfa Antibiotics Rash       Past Surgical History:   Procedure Laterality Date   • BILATERAL BREAST REDUCTION     • COLONOSCOPY     • WISDOM TOOTH EXTRACTION         Family History   Problem Relation Age of Onset   • Diverticulosis Father    • Hypertension Maternal Grandmother    • Hyperlipidemia Maternal Grandmother    • Arthritis Maternal Grandfather    • Stroke Maternal Grandfather    • Heart attack Maternal Grandfather    • Cancer Other    • Colon cancer Neg Hx    • Colon polyps Neg Hx    • Esophageal cancer Neg Hx        Social History     Socioeconomic History   • Marital status: Single   Tobacco Use   • Smoking status: Former Smoker     Years: 5.00     Types: Cigarettes     Quit date: 10/16/2017     Years since quittin.0   • Smokeless tobacco: Never Used   Vaping Use   • Vaping Use: Every day   Substance and Sexual Activity   • Alcohol use: No   • Drug use: Not Currently     Types: Marijuana   • Sexual  activity: Yes     Partners: Male     Birth control/protection: OCP     Comment: Nuva Ring           Objective   Physical Exam  Vitals and nursing note reviewed.   Constitutional:       Appearance: Normal appearance.   HENT:      Head: Normocephalic and atraumatic.   Eyes:      Extraocular Movements: Extraocular movements intact.      Pupils: Pupils are equal, round, and reactive to light.   Cardiovascular:      Rate and Rhythm: Normal rate and regular rhythm.      Pulses: Normal pulses.      Heart sounds: Normal heart sounds.   Pulmonary:      Effort: Pulmonary effort is normal.      Breath sounds: Normal breath sounds.   Abdominal:      General: Abdomen is flat. Bowel sounds are normal.      Palpations: Abdomen is soft.      Comments: Mild tenderness to palpation over epigastric area   Musculoskeletal:      Cervical back: Normal range of motion and neck supple.   Skin:     Capillary Refill: Capillary refill takes less than 2 seconds.   Neurological:      General: No focal deficit present.      Mental Status: She is alert and oriented to person, place, and time. Mental status is at baseline.      GCS: GCS eye subscore is 4. GCS verbal subscore is 5. GCS motor subscore is 6.      Sensory: Sensation is intact.      Motor: Motor function is intact.      Gait: Gait is intact.   Psychiatric:         Attention and Perception: Attention and perception normal.         Mood and Affect: Mood and affect normal.         Speech: Speech normal.         Behavior: Behavior normal. Behavior is cooperative.         Procedures     none        ED Course  ED Course as of 11/09/21 2326 Tue Nov 09, 2021 2324 Lipase: 29 [PF]   2324 RBC, UA(!): 6-12 [PF]   2324 WBC, UA: None Seen [PF]   2324 Bacteria, UA(!): 1+ [PF]   2324 Squamous Epithelial Cells, UA: 0-2 [PF]   2324 Glucose: 92 [PF]   2324 Creatinine: 0.87 [PF]   2324 BUN: 14 [PF]   2324 Sodium(!): 134 [PF]   2324 Potassium: 3.7 [PF]   2324 Chloride: 102 [PF]   2324 CO2(!): 21.4 [PF]    2324 Calcium: 8.8 [PF]   2324 Total Protein: 6.9 [PF]   2324 Albumin: 4.10 [PF]   2324 ALT (SGPT): 9 [PF]   2324 AST (SGOT): 13 [PF]   2324 Alkaline Phosphatase: 53 [PF]   2324 Total Bilirubin: 0.6 [PF]   2324 HCG, Urine QL: Negative [PF]   2324 Bilirubin, UA: Negative [PF]   2324 Blood, UA(!): Moderate (2+) [PF]   2324 Protein, UA(!): Trace [PF]   2324 Leukocytes, UA: Negative [PF]   2324 Nitrite, UA: Negative [PF]   2324 WBC: 8.51 [PF]   2324 Hemoglobin: 13.3 [PF]   2324 Hematocrit: 38.4 [PF]   2324 Platelets: 292 [PF]   2324 CT abdomen pelvis without acute findings per radiologist [PF]      ED Course User Index  [PF] Tunde Elkins,                                            [facility]  23:26 EST  I received care from nurse practitioner for follow-up of CT.  26-year-old female present with upper abdominal pain and vomiting.  No acute findings on CT patient is afebrile normotensive no tachycardia normal room air sats in percent.  No pancreatitis gallbladder abnormality no transaminitis patient discharged home in stable condition supportive care prescription for Zofran and Pepcid.  Outpatient follow-up return precautions discussed.  Final diagnoses:   Upper abdominal pain       ED Disposition  ED Disposition     ED Disposition Condition Comment    Discharge Stable           Clara Trinh, APRN  [address] [place] [address] [phone]          [facility] Emergency Department  [address] [facility] [address] [phone]    As needed, If symptoms worsen         Medication List      New Prescriptions    famotidine 20 MG tablet  Commonly known as: PEPCID  Take 1 tablet by mouth Every Night.           Where to Get Your Medications      These medications were sent to [id number] - [place], [place] - [address] - [phone]  - [phone] FX  [address] [place] [address] [place] [address]    Phone: [phone]   · famotidine 20 MG tablet  · ondansetron ODT 4 MG  disintegrating tablet          Tunde Elkins DO  11/09/21 4613

## 2021-11-11 ENCOUNTER — TRANSCRIBE ORDERS (OUTPATIENT)
Dept: LAB | Facility: HOSPITAL | Age: 26
End: 2021-11-11

## 2021-11-11 ENCOUNTER — LAB (OUTPATIENT)
Dept: LAB | Facility: HOSPITAL | Age: 26
End: 2021-11-11

## 2021-11-11 DIAGNOSIS — R10.30 LOWER ABDOMINAL PAIN: Primary | ICD-10-CM

## 2021-11-11 DIAGNOSIS — R10.30 LOWER ABDOMINAL PAIN: ICD-10-CM

## 2021-11-11 LAB
ALBUMIN SERPL-MCNC: 3.8 G/DL (ref 3.5–5.2)
ALBUMIN/GLOB SERPL: 1.3 G/DL
ALP SERPL-CCNC: 49 U/L (ref 39–117)
ALT SERPL W P-5'-P-CCNC: 9 U/L (ref 1–33)
AMYLASE SERPL-CCNC: 36 U/L (ref 28–100)
ANION GAP SERPL CALCULATED.3IONS-SCNC: 6.7 MMOL/L (ref 5–15)
AST SERPL-CCNC: 13 U/L (ref 1–32)
BASOPHILS # BLD AUTO: 0.01 10*3/MM3 (ref 0–0.2)
BASOPHILS NFR BLD AUTO: 0.2 % (ref 0–1.5)
BILIRUB SERPL-MCNC: 0.2 MG/DL (ref 0–1.2)
BUN SERPL-MCNC: 9 MG/DL (ref 6–20)
BUN/CREAT SERPL: 10.1 (ref 7–25)
CALCIUM SPEC-SCNC: 9.1 MG/DL (ref 8.6–10.5)
CHLORIDE SERPL-SCNC: 106 MMOL/L (ref 98–107)
CO2 SERPL-SCNC: 26.3 MMOL/L (ref 22–29)
CREAT SERPL-MCNC: 0.89 MG/DL (ref 0.57–1)
CRP SERPL-MCNC: 3.16 MG/DL (ref 0–0.5)
DEPRECATED RDW RBC AUTO: 35.7 FL (ref 37–54)
EOSINOPHIL # BLD AUTO: 0.05 10*3/MM3 (ref 0–0.4)
EOSINOPHIL NFR BLD AUTO: 1.1 % (ref 0.3–6.2)
ERYTHROCYTE [DISTWIDTH] IN BLOOD BY AUTOMATED COUNT: 11 % (ref 12.3–15.4)
GFR SERPL CREATININE-BSD FRML MDRD: 77 ML/MIN/1.73
GLOBULIN UR ELPH-MCNC: 2.9 GM/DL
GLUCOSE SERPL-MCNC: 115 MG/DL (ref 65–99)
HCT VFR BLD AUTO: 39.7 % (ref 34–46.6)
HGB BLD-MCNC: 12.9 G/DL (ref 12–15.9)
IMM GRANULOCYTES # BLD AUTO: 0.01 10*3/MM3 (ref 0–0.05)
IMM GRANULOCYTES NFR BLD AUTO: 0.2 % (ref 0–0.5)
LYMPHOCYTES # BLD AUTO: 1.58 10*3/MM3 (ref 0.7–3.1)
LYMPHOCYTES NFR BLD AUTO: 36.1 % (ref 19.6–45.3)
MCH RBC QN AUTO: 29 PG (ref 26.6–33)
MCHC RBC AUTO-ENTMCNC: 32.5 G/DL (ref 31.5–35.7)
MCV RBC AUTO: 89.2 FL (ref 79–97)
MONOCYTES # BLD AUTO: 0.45 10*3/MM3 (ref 0.1–0.9)
MONOCYTES NFR BLD AUTO: 10.3 % (ref 5–12)
NEUTROPHILS NFR BLD AUTO: 2.28 10*3/MM3 (ref 1.7–7)
NEUTROPHILS NFR BLD AUTO: 52.1 % (ref 42.7–76)
NRBC BLD AUTO-RTO: 0 /100 WBC (ref 0–0.2)
PLATELET # BLD AUTO: 356 10*3/MM3 (ref 140–450)
PMV BLD AUTO: 9.9 FL (ref 6–12)
POTASSIUM SERPL-SCNC: 4.2 MMOL/L (ref 3.5–5.2)
PROT SERPL-MCNC: 6.7 G/DL (ref 6–8.5)
RBC # BLD AUTO: 4.45 10*6/MM3 (ref 3.77–5.28)
SODIUM SERPL-SCNC: 139 MMOL/L (ref 136–145)
WBC # BLD AUTO: 4.38 10*3/MM3 (ref 3.4–10.8)

## 2021-11-11 PROCEDURE — 36415 COLL VENOUS BLD VENIPUNCTURE: CPT

## 2021-11-11 PROCEDURE — 86140 C-REACTIVE PROTEIN: CPT

## 2021-11-11 PROCEDURE — 80053 COMPREHEN METABOLIC PANEL: CPT

## 2021-11-11 PROCEDURE — 85025 COMPLETE CBC W/AUTO DIFF WBC: CPT

## 2021-11-11 PROCEDURE — 82150 ASSAY OF AMYLASE: CPT

## 2021-11-11 PROCEDURE — 87086 URINE CULTURE/COLONY COUNT: CPT

## 2021-11-12 LAB — BACTERIA SPEC AEROBE CULT: NORMAL

## 2021-11-29 ENCOUNTER — TELEPHONE (OUTPATIENT)
Dept: GASTROENTEROLOGY | Facility: CLINIC | Age: 26
End: 2021-11-29

## 2021-11-29 NOTE — TELEPHONE ENCOUNTER
Looks like this was an acute issue that is not something I have seen her for that she was seen in the ER for, at least not based on the note from the ED.  Has she seen her primary care?  We can get her in for a follow-up with me but I also recommend seeing her primary care given the acuteness of the issue.

## 2021-11-29 NOTE — TELEPHONE ENCOUNTER
Saw primary a couple weeks ago - they only giver her zofran (which is not working) - scheduled f/u on 12/01

## 2021-12-01 ENCOUNTER — OFFICE VISIT (OUTPATIENT)
Dept: GASTROENTEROLOGY | Facility: CLINIC | Age: 26
End: 2021-12-01

## 2021-12-01 ENCOUNTER — LAB (OUTPATIENT)
Dept: LAB | Facility: HOSPITAL | Age: 26
End: 2021-12-01

## 2021-12-01 VITALS
TEMPERATURE: 97.5 F | OXYGEN SATURATION: 98 % | DIASTOLIC BLOOD PRESSURE: 60 MMHG | HEIGHT: 61 IN | SYSTOLIC BLOOD PRESSURE: 112 MMHG | WEIGHT: 166.4 LBS | BODY MASS INDEX: 31.42 KG/M2 | HEART RATE: 89 BPM

## 2021-12-01 DIAGNOSIS — R11.0 NAUSEA: ICD-10-CM

## 2021-12-01 DIAGNOSIS — K29.00 ACUTE SUPERFICIAL GASTRITIS WITHOUT HEMORRHAGE: ICD-10-CM

## 2021-12-01 DIAGNOSIS — K29.00 ACUTE SUPERFICIAL GASTRITIS WITHOUT HEMORRHAGE: Primary | ICD-10-CM

## 2021-12-01 PROCEDURE — 99214 OFFICE O/P EST MOD 30 MIN: CPT | Performed by: NURSE PRACTITIONER

## 2021-12-01 PROCEDURE — 83013 H PYLORI (C-13) BREATH: CPT

## 2021-12-01 RX ORDER — DICYCLOMINE HCL 20 MG
20 TABLET ORAL 3 TIMES DAILY
COMMUNITY
Start: 2021-11-10 | End: 2022-08-26 | Stop reason: HOSPADM

## 2021-12-01 RX ORDER — PANTOPRAZOLE SODIUM 40 MG/1
40 TABLET, DELAYED RELEASE ORAL DAILY
Qty: 14 TABLET | Refills: 0 | Status: SHIPPED | OUTPATIENT
Start: 2021-12-01 | End: 2021-12-15

## 2021-12-01 NOTE — PROGRESS NOTES
Follow Up      Patient Name: Janae Padilla  : 1995   MRN: [id number]     Chief Complaint:    Chief Complaint   Patient presents with   • Follow-up     abdominal pain,nausea       History of Present Illness: Janae Padilla is a 26 y.o. female who is here today for abdominal pain and nausea.    Janae was last seen in the office in September to discuss recurrent diverticulitis.  Due to her young age and the frequency of these events, she was referred to colorectal for surgical consult.  Surgery is not indicated at this time.    Today, she reports epigastric abdominal pain, and nausea.  She presented to the ED for this approximately 1 month ago when the symptoms on and was prescribed Pepcid and Zofran.  Zofran was helpful but she admits she did not  the Pepcid.  Pain is worse with eating.  Associated with bloating.  Symptoms improved for a time but has began to recur.  Mostly nausea now. There is no reflux.  She has never had an EGD- she has been told she had an ulcer in the past based on symptoms.  There are no new medications that were started prior to the pain/nausea.    Bowel habits are normal right now.    CT Abdomen Pelvis With Contrast (2021 20:15)    Subjective      Review of Systems:   Review of Systems   Constitutional: Negative for appetite change and unexpected weight loss.   HENT: Negative for trouble swallowing.    Gastrointestinal: Positive for abdominal distention, abdominal pain and nausea. Negative for anal bleeding, blood in stool, constipation, diarrhea, rectal pain, vomiting, GERD and indigestion.       Medications:     Current Outpatient Medications:   •  amitriptyline (ELAVIL) 25 MG tablet, Take 1 tablet by mouth every night at bedtime., Disp: 30 tablet, Rfl: 11  •  dicyclomine (BENTYL) 20 MG tablet, Take 20 mg by mouth 3 (Three) Times a Day., Disp: , Rfl:   •  etonogestrel-ethinyl estradiol (NUVARING) 0.12-0.015 MG/24HR vaginal ring, Insert 1 ring into vagina one  time a month for 3 weeks then remove for 1 week, Disp: , Rfl:   •  Mirabegron ER (MYRBETRIQ) 50 MG tablet sustained-release 24 hour 24 hr tablet, Take 50 mg by mouth Daily., Disp: 30 tablet, Rfl: 11  •  ondansetron ODT (ZOFRAN-ODT) 4 MG disintegrating tablet, Place 1 tablet on the tongue Every 6 (Six) Hours As Needed for Nausea., Disp: 12 tablet, Rfl: 0  •  pantoprazole (PROTONIX) 40 MG EC tablet, Take 1 tablet by mouth Daily for 14 days., Disp: 14 tablet, Rfl: 0  No current facility-administered medications for this visit.    Allergies:   Allergies   Allergen Reactions   • Bactrim [Sulfamethoxazole-Trimethoprim] Rash   • Sulfa Antibiotics Rash       Social History:   Social History     Socioeconomic History   • Marital status: Single   Tobacco Use   • Smoking status: Former Smoker     Years: 5.00     Types: Cigarettes     Quit date: 10/16/2017     Years since quittin.1   • Smokeless tobacco: Never Used   Vaping Use   • Vaping Use: Every day   • Substances: Nicotine, Flavoring   • Devices: Pre-filled or refillable cartridge, Refillable tank   Substance and Sexual Activity   • Alcohol use: No   • Drug use: Not Currently     Types: Marijuana   • Sexual activity: Yes     Partners: Male     Birth control/protection: OCP     Comment: Nuva Ring        Surgical History:   Past Surgical History:   Procedure Laterality Date   • BILATERAL BREAST REDUCTION     • COLONOSCOPY     • WISDOM TOOTH EXTRACTION          Medical History:   Past Medical History:   Diagnosis Date   • Diverticulitis    • Diverticulitis of colon    • GERD (gastroesophageal reflux disease)    • Hepatitis    • Interstitial cystitis    • Intestinal disease    • UTI (urinary tract infection)         Objective     Physical Exam:  Vital Signs:   Vitals:    21 1356   BP: 112/60   BP Location: Left arm   Patient Position: Sitting   Cuff Size: Adult   Pulse: 89   Temp: 97.5 °F (36.4 °C)   TempSrc: Temporal   SpO2: 98%   Weight: 75.5 kg (166 lb 6.4 oz)  "  Height: 154.9 cm (60.98\")     Body mass index is 31.46 kg/m².     Physical Exam  Vitals and nursing note reviewed.   Constitutional:       General: She is not in acute distress.     Appearance: She is well-developed.   Pulmonary:      Effort: Pulmonary effort is normal. No accessory muscle usage or respiratory distress.   Abdominal:      Tenderness: There is abdominal tenderness in the epigastric area. There is no guarding. Negative signs include Garg's sign.   Skin:     Coloration: Skin is not pale.      Findings: No erythema.   Neurological:      Mental Status: She is alert and oriented to person, place, and time.   Psychiatric:         Speech: Speech normal.         Behavior: Behavior normal.         Thought Content: Thought content normal.         Judgment: Judgment normal.       Reviewed ED visit, CT abdomen, labs  Assessment / Plan      Assessment/Plan:   Diagnoses and all orders for this visit:    1. Acute superficial gastritis without hemorrhage (Primary)  -     H. Pylori Breath Test - Breath, Lung; Future  -     pantoprazole (PROTONIX) 40 MG EC tablet; Take 1 tablet by mouth Daily for 14 days.  Dispense: 14 tablet; Refill: 0    2. Nausea  -     H. Pylori Breath Test - Breath, Lung; Future  -     pantoprazole (PROTONIX) 40 MG EC tablet; Take 1 tablet by mouth Daily for 14 days.  Dispense: 14 tablet; Refill: 0       Will start 14 course of PPI.  May use Mylanta with meals.  Avoid spicy, greasy, acidic foods, tobacco, alcohol and NSAIDs for the next 14 days.  Slowly resume normal diet.    Follow Up:   Return if symptoms worsen or fail to improve.    Plan of care reviewed with the patient at the conclusion of today's visit.  Education was provided regarding diagnosis, management, and any prescribed or recommended OTC medications.  Patient verbalized understanding of and agreement with management plan.         JAYNA Montes De Oca  [facility]     "

## 2021-12-02 ENCOUNTER — DOCUMENTATION (OUTPATIENT)
Dept: GENETICS | Facility: HOSPITAL | Age: 26
End: 2021-12-02

## 2021-12-02 LAB — UREA BREATH TEST QL: NEGATIVE

## 2021-12-02 NOTE — PROGRESS NOTES
Janae Padilla, a 26-year-old female, was referred for genetic counseling due to a family history of breast cancer and reported genetic test results in maternal relatives.   Ms. Padilla was 11 years old at menarche and is nulliparous.  She is premenopausal and retains her uterus and ovaries.  She has not had any breast imaging in the past, but has had a breast reduction.  She was interested in discussing her risk for a hereditary cancer syndrome.  Ms. Padilla was interested in pursuing comprehensive genetic testing to evaluate her risk of cancer, therefore the CancerNext panel was ordered through [employer] which analyzes BRCA1/2 and 34 additional genes associated with hereditary cancer risk. Genetic testing was negative for pathogenic mutations in BRCA1/2 and 34 additional genes included on the CancerNext panel. These normal results were discussed with Ms. Padilla by telephone on 12/2/21.     PERTINENT FAMILY HISTORY: (See attached pedigree)   Mat. Aunt:  Breast cancer, 61  Mat. Grandfather: Prostate cancer, 70s  Mat. 1st cousins (x2): Report positive genetic testing, specific gene not known and copy of results not available for review.       RISK ASSESSMENT:  Ms. Padilla’s family history of breast cancer as well as reported positive genetic testing in a relative raised the question of a hereditary cancer syndrome. When a copy of a relative’s genetic test result is not available, or other affected relatives have not had testing, comprehensive testing is appropriate.   Based on the presence of other clinically significant breast cancer related genes, the standard approach to hereditary cancer genetic testing at this time is via multi-gene panel, which evaluates for BRCA1/2 as well as several additional genes associated with a hereditary risk for breast cancer and other cancers.     Because genetic testing was negative, Ms. Padilla’s management should be guided by a family history-based risk assessment. The  Gloria, ryanne risk assessment is able to take into account personal factors, genetic test results, and family history when calculating risk for breast cancer.  Computer modeling estimates that Ms. Padilla’s lifetime risk for breast cancer is 18.6%, in comparison to the general population risk of 12.5%.  If a mutation was confirmed in the family, this risk assessment could be updated to include that information.  A risk greater than 20% is considered high risk and warrants increased screening.  This risk assessment is based on the information provided at the time of the appointment and could change in the future should new information be obtained.    GENETIC COUNSELING:  We reviewed the family history information in detail. Cases of cancer follow three general patterns: sporadic, familial, and hereditary.  While most cancer is sporadic, some cases appear to occur in family clusters.  These cases are said to be familial and account for 10-20% of cancer cases.  Familial cases may be due to a combination of shared genes and environmental factors among family members.  In even fewer cases (5-10%), the risk for cancer is inherited, and the genes responsible for the increased cancer risk are known.       Family histories typical of hereditary cancer syndromes usually include multiple first- and second-degree relatives diagnosed with cancer types that define a syndrome.  These cases tend to be diagnosed at younger-than-expected ages and can be bilateral or multifocal.  The cancer in these families follows an autosomal dominant inheritance pattern, which indicates the likely presence of a mutation in a cancer susceptibility gene.  Children and siblings of an individual believed to carry this mutation have a 50% chance of inheriting that mutation, thereby inheriting the increased risk to develop cancer.  These mutations can be passed down from the maternal or the paternal lineage.     Hereditary breast and ovarian cancer  accounts for 5-10% of all cases of breast cancer or ovarian cancer.  A significant proportion (50%) of hereditary breast, ovarian, or pancreatic cancer can be attributed to mutations in the BRCA1 and BRCA2 genes.  Mutations in these genes confer an increased risk for breast cancer, ovarian cancer, male breast cancer, prostate cancer, and pancreatic cancer.  Women with a BRCA1 or BRCA2 mutation have up to an 87% lifetime risk of breast cancer and up to a 44% risk of ovarian cancer.  There are other clinically significant breast cancer related genes in addition to BRCA1/2, including PALB2, AVIS, and CHEK2.  We discussed the varying degrees of risk and associated screening/risk reduction recommendations that could be made based on those risks.  There are also other, more rare, hereditary cancer syndromes. Based on Ms. Padilla’s family history and her desire to get more information regarding her personal risks, she opted to pursue testing through a panel evaluating several other genes known to increase the risk for cancer.     GENETIC TESTING:  The risks, benefits, and limitations of genetic testing and implications for clinical management following testing were reviewed.  DNA test results can influence decisions regarding screening and prevention.  Genetic testing can have significant psychological implications for both individuals and families. Also discussed was the possibility of employment and insurance discrimination based on genetic test results and the laws in place to prevent this, as well as the limitations of these laws.       We discussed panel testing, which would involve testing 36 genes associated with increased cancer risk. The implications of a positive or negative test result were discussed.  We also discussed the importance of testing on an affected relative. In general, a negative genetic test result is most informative if a mutation has first been established in an affected member of the family.  In  cases where an affected individual is not available or interested in testing, it is appropriate to offer testing to an unaffected individual. We discussed the possibility that, in some cases, genetic test results may be ambiguous due to the identification of a genetic variant. These variants may or may not be associated with an increased cancer risk. With multigene panel testing, it is not uncommon for a variant of uncertain significance (VUS) to be identified.  If a VUS is identified, testing family members is typically not recommended and screening recommendations are made based on the family history.  The laboratories that perform genetic testing work to reclassify the VUS and send out an amended report if and when a VUS is reclassified.  The majority of variant findings are ultimately reclassified to a negative result. Given her family history, a negative test result does not eliminate all cancer risk, although the risk would not be as high as it would with positive genetic testing.     TEST RESULTS: Genetic testing was negative for known pathogenic mutations by sequencing and rearrangement testing of the 36 genes on the CancerNext panel (see attached results).  This negative result greatly lowers the risk of a hereditary cancer syndrome for Ms. Padilla.  If a relative has had a mutation identified in one of the genes evaluated, this negative testing would indicate that Ms. Padilla did not inherit it.  It would still be recommended that a copy of a relative’s results confirming the a pathogenic mutation be made available for review if possible. This assessment is based on the information provided at the time of the consultation.     CANCER PREVENTION:  Options available to individuals with an elevated lifetime risk for breast cancer were briefly discussed.  Based on computer modeling, Ms. Padilla’s lifetime risk for breast cancer would not be considered “high risk” (>20%).   Per NCCN guidelines, general  population screening, including annual mammogram at age 40 and annual clinical breast exam would be recommended.  These recommendations could change should there be updates to the family history.    PLAN:  Genetic counseling remains available to Ms. Padilla. If she has any questions or concerns, she is welcome to contact me at [phone].    Concha Reyes MS, [facility] [place], [facility]  Licensed Certified Genetic Counselor    Cc: JAYNA Herrera

## 2022-08-25 ENCOUNTER — TRANSCRIBE ORDERS (OUTPATIENT)
Dept: GENERAL RADIOLOGY | Facility: HOSPITAL | Age: 27
End: 2022-08-25

## 2022-08-25 ENCOUNTER — HOSPITAL ENCOUNTER (OUTPATIENT)
Dept: GENERAL RADIOLOGY | Facility: HOSPITAL | Age: 27
Discharge: HOME | End: 2022-08-25
Admitting: NURSE PRACTITIONER

## 2022-08-25 DIAGNOSIS — R19.8 CHANGE IN BOWEL MOVEMENT: ICD-10-CM

## 2022-08-25 DIAGNOSIS — R19.8 CHANGE IN BOWEL MOVEMENT: Primary | ICD-10-CM

## 2022-08-25 PROCEDURE — 74022 RADEX COMPL AQT ABD SERIES: CPT

## 2022-08-26 ENCOUNTER — HOSPITAL ENCOUNTER (EMERGENCY)
Facility: HOSPITAL | Age: 27
Discharge: HOME | End: 2022-08-27
Attending: FAMILY MEDICINE | Admitting: FAMILY MEDICINE

## 2022-08-26 ENCOUNTER — APPOINTMENT (OUTPATIENT)
Dept: CT IMAGING | Facility: HOSPITAL | Age: 27
End: 2022-08-26

## 2022-08-26 VITALS
RESPIRATION RATE: 16 BRPM | WEIGHT: 157 LBS | DIASTOLIC BLOOD PRESSURE: 75 MMHG | HEART RATE: 70 BPM | HEIGHT: 61 IN | TEMPERATURE: 98 F | BODY MASS INDEX: 29.64 KG/M2 | OXYGEN SATURATION: 100 % | SYSTOLIC BLOOD PRESSURE: 101 MMHG

## 2022-08-26 DIAGNOSIS — R11.0 NAUSEA: ICD-10-CM

## 2022-08-26 DIAGNOSIS — K58.0 IRRITABLE BOWEL SYNDROME WITH DIARRHEA: ICD-10-CM

## 2022-08-26 DIAGNOSIS — R10.9 ABDOMINAL PAIN, UNSPECIFIED ABDOMINAL LOCATION: Primary | ICD-10-CM

## 2022-08-26 LAB
ALBUMIN SERPL-MCNC: 5 G/DL (ref 3.5–5.2)
ALBUMIN/GLOB SERPL: 1.7 G/DL
ALP SERPL-CCNC: 56 U/L (ref 39–117)
ALT SERPL W P-5'-P-CCNC: 18 U/L (ref 1–33)
ANION GAP SERPL CALCULATED.3IONS-SCNC: 15.1 MMOL/L (ref 5–15)
AST SERPL-CCNC: 19 U/L (ref 1–32)
BACTERIA UR QL AUTO: ABNORMAL /HPF
BASOPHILS # BLD AUTO: 0.04 10*3/MM3 (ref 0–0.2)
BASOPHILS NFR BLD AUTO: 0.6 % (ref 0–1.5)
BILIRUB SERPL-MCNC: 0.6 MG/DL (ref 0–1.2)
BILIRUB UR QL STRIP: NEGATIVE
BUN SERPL-MCNC: 14 MG/DL (ref 6–20)
BUN/CREAT SERPL: 14.7 (ref 7–25)
CALCIUM SPEC-SCNC: 9.8 MG/DL (ref 8.6–10.5)
CHLORIDE SERPL-SCNC: 99 MMOL/L (ref 98–107)
CLARITY UR: CLEAR
CO2 SERPL-SCNC: 21.9 MMOL/L (ref 22–29)
COLOR UR: YELLOW
CREAT SERPL-MCNC: 0.95 MG/DL (ref 0.57–1)
DEPRECATED RDW RBC AUTO: 33.5 FL (ref 37–54)
EGFRCR SERPLBLD CKD-EPI 2021: 85.4 ML/MIN/1.73
EOSINOPHIL # BLD AUTO: 0.05 10*3/MM3 (ref 0–0.4)
EOSINOPHIL NFR BLD AUTO: 0.7 % (ref 0.3–6.2)
ERYTHROCYTE [DISTWIDTH] IN BLOOD BY AUTOMATED COUNT: 11.3 % (ref 12.3–15.4)
GLOBULIN UR ELPH-MCNC: 2.9 GM/DL
GLUCOSE SERPL-MCNC: 69 MG/DL (ref 65–99)
GLUCOSE UR STRIP-MCNC: NEGATIVE MG/DL
HCT VFR BLD AUTO: 39.9 % (ref 34–46.6)
HGB BLD-MCNC: 13.9 G/DL (ref 12–15.9)
HGB UR QL STRIP.AUTO: ABNORMAL
HOLD SPECIMEN: NORMAL
HOLD SPECIMEN: NORMAL
HYALINE CASTS UR QL AUTO: ABNORMAL /LPF
IMM GRANULOCYTES # BLD AUTO: 0.05 10*3/MM3 (ref 0–0.05)
IMM GRANULOCYTES NFR BLD AUTO: 0.7 % (ref 0–0.5)
KETONES UR QL STRIP: ABNORMAL
LEUKOCYTE ESTERASE UR QL STRIP.AUTO: NEGATIVE
LIPASE SERPL-CCNC: 46 U/L (ref 13–60)
LYMPHOCYTES # BLD AUTO: 2.52 10*3/MM3 (ref 0.7–3.1)
LYMPHOCYTES NFR BLD AUTO: 34.9 % (ref 19.6–45.3)
MCH RBC QN AUTO: 28.7 PG (ref 26.6–33)
MCHC RBC AUTO-ENTMCNC: 34.8 G/DL (ref 31.5–35.7)
MCV RBC AUTO: 82.4 FL (ref 79–97)
MONOCYTES # BLD AUTO: 0.61 10*3/MM3 (ref 0.1–0.9)
MONOCYTES NFR BLD AUTO: 8.4 % (ref 5–12)
NEUTROPHILS NFR BLD AUTO: 3.96 10*3/MM3 (ref 1.7–7)
NEUTROPHILS NFR BLD AUTO: 54.7 % (ref 42.7–76)
NITRITE UR QL STRIP: NEGATIVE
NRBC BLD AUTO-RTO: 0 /100 WBC (ref 0–0.2)
PH UR STRIP.AUTO: 5.5 [PH] (ref 5–8)
PLATELET # BLD AUTO: 343 10*3/MM3 (ref 140–450)
PMV BLD AUTO: 9.5 FL (ref 6–12)
POTASSIUM SERPL-SCNC: 3.4 MMOL/L (ref 3.5–5.2)
PROT SERPL-MCNC: 7.9 G/DL (ref 6–8.5)
PROT UR QL STRIP: NEGATIVE
RBC # BLD AUTO: 4.84 10*6/MM3 (ref 3.77–5.28)
RBC # UR STRIP: ABNORMAL /HPF
REF LAB TEST METHOD: ABNORMAL
SODIUM SERPL-SCNC: 136 MMOL/L (ref 136–145)
SP GR UR STRIP: 1.02 (ref 1–1.03)
SQUAMOUS #/AREA URNS HPF: ABNORMAL /HPF
UROBILINOGEN UR QL STRIP: ABNORMAL
WBC # UR STRIP: ABNORMAL /HPF
WBC NRBC COR # BLD: 7.23 10*3/MM3 (ref 3.4–10.8)
WHOLE BLOOD HOLD COAG: NORMAL
WHOLE BLOOD HOLD SPECIMEN: NORMAL

## 2022-08-26 PROCEDURE — 25010000002 KETOROLAC TROMETHAMINE PER 15 MG: Performed by: FAMILY MEDICINE

## 2022-08-26 PROCEDURE — 99283 EMERGENCY DEPT VISIT LOW MDM: CPT

## 2022-08-26 PROCEDURE — 81001 URINALYSIS AUTO W/SCOPE: CPT | Performed by: FAMILY MEDICINE

## 2022-08-26 PROCEDURE — 80053 COMPREHEN METABOLIC PANEL: CPT | Performed by: FAMILY MEDICINE

## 2022-08-26 PROCEDURE — 25010000002 IOPAMIDOL 61 % SOLUTION: Performed by: FAMILY MEDICINE

## 2022-08-26 PROCEDURE — 96374 THER/PROPH/DIAG INJ IV PUSH: CPT

## 2022-08-26 PROCEDURE — 85025 COMPLETE CBC W/AUTO DIFF WBC: CPT | Performed by: FAMILY MEDICINE

## 2022-08-26 PROCEDURE — 96375 TX/PRO/DX INJ NEW DRUG ADDON: CPT

## 2022-08-26 PROCEDURE — 83690 ASSAY OF LIPASE: CPT | Performed by: FAMILY MEDICINE

## 2022-08-26 PROCEDURE — 74177 CT ABD & PELVIS W/CONTRAST: CPT

## 2022-08-26 PROCEDURE — 25010000002 ONDANSETRON PER 1 MG: Performed by: FAMILY MEDICINE

## 2022-08-26 RX ORDER — KETOROLAC TROMETHAMINE 30 MG/ML
15 INJECTION, SOLUTION INTRAMUSCULAR; INTRAVENOUS ONCE
Status: COMPLETED | OUTPATIENT
Start: 2022-08-26 | End: 2022-08-26

## 2022-08-26 RX ORDER — ONDANSETRON 4 MG/1
4 TABLET, ORALLY DISINTEGRATING ORAL EVERY 6 HOURS PRN
Qty: 10 TABLET | Refills: 0 | OUTPATIENT
Start: 2022-08-26 | End: 2022-12-04

## 2022-08-26 RX ORDER — SODIUM CHLORIDE 0.9 % (FLUSH) 0.9 %
10 SYRINGE (ML) INJECTION AS NEEDED
Status: DISCONTINUED | OUTPATIENT
Start: 2022-08-26 | End: 2022-08-27 | Stop reason: HOSPADM

## 2022-08-26 RX ORDER — ONDANSETRON 2 MG/ML
4 INJECTION INTRAMUSCULAR; INTRAVENOUS ONCE
Status: COMPLETED | OUTPATIENT
Start: 2022-08-26 | End: 2022-08-26

## 2022-08-26 RX ADMIN — SODIUM CHLORIDE 1000 ML: 9 INJECTION, SOLUTION INTRAVENOUS at 21:04

## 2022-08-26 RX ADMIN — KETOROLAC TROMETHAMINE 15 MG: 30 INJECTION, SOLUTION INTRAMUSCULAR; INTRAVENOUS at 21:04

## 2022-08-26 RX ADMIN — ONDANSETRON 4 MG: 2 INJECTION INTRAMUSCULAR; INTRAVENOUS at 21:04

## 2022-08-26 RX ADMIN — IOPAMIDOL 100 ML: 612 INJECTION, SOLUTION INTRAVENOUS at 21:28

## 2022-09-01 ENCOUNTER — OFFICE VISIT (OUTPATIENT)
Dept: GASTROENTEROLOGY | Facility: CLINIC | Age: 27
End: 2022-09-01

## 2022-09-01 ENCOUNTER — LAB (OUTPATIENT)
Dept: LAB | Facility: HOSPITAL | Age: 27
End: 2022-09-01

## 2022-09-01 VITALS
HEIGHT: 61 IN | DIASTOLIC BLOOD PRESSURE: 72 MMHG | TEMPERATURE: 98.6 F | HEART RATE: 106 BPM | OXYGEN SATURATION: 98 % | SYSTOLIC BLOOD PRESSURE: 118 MMHG | BODY MASS INDEX: 29.27 KG/M2 | WEIGHT: 155 LBS

## 2022-09-01 DIAGNOSIS — R10.12 LUQ PAIN: Primary | ICD-10-CM

## 2022-09-01 DIAGNOSIS — R19.4 CHANGE IN BOWEL HABITS: ICD-10-CM

## 2022-09-01 DIAGNOSIS — R19.7 DIARRHEA, UNSPECIFIED TYPE: ICD-10-CM

## 2022-09-01 DIAGNOSIS — R10.12 LUQ PAIN: ICD-10-CM

## 2022-09-01 LAB
BASOPHILS # BLD AUTO: 0.03 10*3/MM3 (ref 0–0.2)
BASOPHILS NFR BLD AUTO: 0.5 % (ref 0–1.5)
DEPRECATED RDW RBC AUTO: 33.8 FL (ref 37–54)
EOSINOPHIL # BLD AUTO: 0.03 10*3/MM3 (ref 0–0.4)
EOSINOPHIL NFR BLD AUTO: 0.5 % (ref 0.3–6.2)
ERYTHROCYTE [DISTWIDTH] IN BLOOD BY AUTOMATED COUNT: 11.2 % (ref 12.3–15.4)
HCT VFR BLD AUTO: 37 % (ref 34–46.6)
HGB BLD-MCNC: 12.7 G/DL (ref 12–15.9)
IMM GRANULOCYTES # BLD AUTO: 0.02 10*3/MM3 (ref 0–0.05)
IMM GRANULOCYTES NFR BLD AUTO: 0.3 % (ref 0–0.5)
LYMPHOCYTES # BLD AUTO: 2.52 10*3/MM3 (ref 0.7–3.1)
LYMPHOCYTES NFR BLD AUTO: 38.8 % (ref 19.6–45.3)
MCH RBC QN AUTO: 28.7 PG (ref 26.6–33)
MCHC RBC AUTO-ENTMCNC: 34.3 G/DL (ref 31.5–35.7)
MCV RBC AUTO: 83.5 FL (ref 79–97)
MONOCYTES # BLD AUTO: 0.52 10*3/MM3 (ref 0.1–0.9)
MONOCYTES NFR BLD AUTO: 8 % (ref 5–12)
NEUTROPHILS NFR BLD AUTO: 3.38 10*3/MM3 (ref 1.7–7)
NEUTROPHILS NFR BLD AUTO: 51.9 % (ref 42.7–76)
NRBC BLD AUTO-RTO: 0 /100 WBC (ref 0–0.2)
PLATELET # BLD AUTO: 297 10*3/MM3 (ref 140–450)
PMV BLD AUTO: 10.3 FL (ref 6–12)
RBC # BLD AUTO: 4.43 10*6/MM3 (ref 3.77–5.28)
WBC NRBC COR # BLD: 6.5 10*3/MM3 (ref 3.4–10.8)

## 2022-09-01 PROCEDURE — 86364 TISS TRNSGLTMNASE EA IG CLAS: CPT

## 2022-09-01 PROCEDURE — 82784 ASSAY IGA/IGD/IGG/IGM EACH: CPT

## 2022-09-01 PROCEDURE — 86258 DGP ANTIBODY EACH IG CLASS: CPT

## 2022-09-01 PROCEDURE — 99214 OFFICE O/P EST MOD 30 MIN: CPT | Performed by: NURSE PRACTITIONER

## 2022-09-01 PROCEDURE — 36415 COLL VENOUS BLD VENIPUNCTURE: CPT

## 2022-09-01 PROCEDURE — 85025 COMPLETE CBC W/AUTO DIFF WBC: CPT

## 2022-09-01 PROCEDURE — 86231 EMA EACH IG CLASS: CPT

## 2022-09-01 PROCEDURE — 80053 COMPREHEN METABOLIC PANEL: CPT

## 2022-09-01 PROCEDURE — 86140 C-REACTIVE PROTEIN: CPT

## 2022-09-01 NOTE — PROGRESS NOTES
"     Follow Up      Patient Name: Janae Padilla  : 1995   MRN: [id number]     Chief Complaint:    Chief Complaint   Patient presents with   • Follow-up     ER visit        History of Present Illness: Janae Padilla is a 26 y.o. female who is here today for follow up on abnormal stools, nausea, abdominal pain.    Symptoms began in July. . Was having diarrhea and \"lots of mucous in the toilet bowl\".  Has not had any stool studies. Was given Bentyl but this made her anxious and constipated.  Symptoms associated with LUQ pain (pressure).  There is nausea but no vomiting.  Some of her symptoms have improved with changing her nuvaring and now not skipping her withdrawal week.  Her appetite is improving but had been poor.  Having a BM 6 times a day.  Passing small amounts at a time.  There is no blood in the stool.  No heartburn.  There is a 10 lb unintentional weight loss.      Was seen last December for acute gastritis and no symptoms did resolve.    Was seen in ER last week and had benign labs and CT scan.  CT Abdomen Pelvis With Contrast (2022 21:17)-  IMPRESSION:  No acute findings in the abdomen or pelvis to account for  patient's symptoms.    SCANNED - COLONOSCOPY (2020)      Sees Kizzy Barfield at [facility] for GYN. Being worked up for endometriosis.      Subjective      Review of Systems:   Review of Systems   Constitutional: Positive for appetite change and unexpected weight loss.   HENT: Negative for trouble swallowing.    Gastrointestinal: Positive for abdominal pain, diarrhea and nausea. Negative for abdominal distention, anal bleeding, blood in stool, constipation, rectal pain, vomiting, GERD and indigestion.       Medications:     Current Outpatient Medications:   •  amitriptyline (ELAVIL) 25 MG tablet, Take 1 tablet by mouth every night at bedtime., Disp: 30 tablet, Rfl: 11  •  etonogestrel-ethinyl estradiol (NUVARING) 0.12-0.015 MG/24HR vaginal ring, Insert 1 ring into " "vagina one time a month for 3 weeks then remove for 1 week, Disp: , Rfl:   •  Mirabegron ER (MYRBETRIQ) 50 MG tablet sustained-release 24 hour 24 hr tablet, Take 50 mg by mouth Daily., Disp: 30 tablet, Rfl: 11  •  ondansetron ODT (ZOFRAN-ODT) 4 MG disintegrating tablet, Place 1 tablet on the tongue Every 6 (Six) Hours As Needed for Nausea or Vomiting., Disp: 10 tablet, Rfl: 0  No current facility-administered medications for this visit.    Allergies:   Allergies   Allergen Reactions   • Bactrim [Sulfamethoxazole-Trimethoprim] Rash   • Sulfa Antibiotics Rash       Social History:   Social History     Socioeconomic History   • Marital status: Single   Tobacco Use   • Smoking status: Former Smoker     Years: 5.00     Types: Cigarettes     Quit date: 10/16/2017     Years since quittin.8   • Smokeless tobacco: Never Used   Vaping Use   • Vaping Use: Every day   • Substances: Nicotine, Flavoring   • Devices: Pre-filled or refillable cartridge, Refillable tank   Substance and Sexual Activity   • Alcohol use: No   • Drug use: Not Currently     Types: Marijuana   • Sexual activity: Yes     Partners: Male     Birth control/protection: OCP     Comment: Nuva Ring        Surgical History:   Past Surgical History:   Procedure Laterality Date   • BILATERAL BREAST REDUCTION     • COLONOSCOPY     • WISDOM TOOTH EXTRACTION          Medical History:   Past Medical History:   Diagnosis Date   • Diverticulitis    • Diverticulitis of colon    • GERD (gastroesophageal reflux disease)    • Hepatitis    • Interstitial cystitis    • Intestinal disease    • UTI (urinary tract infection)         Objective     Physical Exam:  Vital Signs:   Vitals:    22 1431   BP: 118/72   BP Location: Right arm   Patient Position: Sitting   Cuff Size: Adult   Pulse: 106   Temp: 98.6 °F (37 °C)   TempSrc: Temporal   SpO2: 98%   Weight: 70.3 kg (155 lb)   Height: 154.9 cm (60.98\")     Body mass index is 29.3 kg/m².     Physical " Exam  Constitutional:       General: She is not in acute distress.     Appearance: She is well-developed.   Pulmonary:      Effort: Pulmonary effort is normal. No accessory muscle usage or respiratory distress.   Skin:     Coloration: Skin is not pale.      Findings: No erythema.   Neurological:      Mental Status: She is alert and oriented to person, place, and time.   Psychiatric:         Speech: Speech normal.         Behavior: Behavior normal.         Thought Content: Thought content normal.         Judgment: Judgment normal.         Assessment / Plan      Assessment/Plan:   Diagnoses and all orders for this visit:    1. LUQ pain (Primary)  -     Ambulatory referral for Screening EGD  -     CBC & Differential; Future  -     C-reactive Protein; Future  -     Celiac Comprehensive Panel; Future  -     Comprehensive Metabolic Panel; Future    2. Change in bowel habits  -     Ambulatory referral for Screening EGD  -     CBC & Differential; Future  -     C-reactive Protein; Future  -     Celiac Comprehensive Panel; Future  -     Comprehensive Metabolic Panel; Future    3. Diarrhea, unspecified type  -     Clostridioides difficile Toxin, PCR - Stool, Per Rectum; Future  -     Calprotectin, Fecal - Stool, Per Rectum; Future  -     Ova & Parasite Examination - Stool, Per Rectum; Future  -     Gastrointestinal Panel, PCR - Stool, Per Rectum; Future  -     Pancreatic Elastase, Fecal - Stool, Per Rectum; Future    Recommend EGD, labs, stool studies.  If fecal Adam elevated, will repeat colonoscopy.  She has had some improvement over the past few days but due to ongoing symptoms and unintentional weight loss, encouraged patient to follow through with work-up.    Follow Up:   Return in about 6 weeks (around 10/13/2022).    Plan of care reviewed with the patient at the conclusion of today's visit.  Education was provided regarding diagnosis, management, and any prescribed or recommended OTC medications.  Patient verbalized  understanding of and agreement with management plan.         JAYNA Montes De Oca  [facility]

## 2022-09-02 ENCOUNTER — LAB REQUISITION (OUTPATIENT)
Dept: LAB | Facility: HOSPITAL | Age: 27
End: 2022-09-02

## 2022-09-02 LAB
ADV 40+41 DNA STL QL NAA+NON-PROBE: NOT DETECTED
ALBUMIN SERPL-MCNC: 4.1 G/DL (ref 3.5–5.2)
ALBUMIN/GLOB SERPL: 1.5 G/DL
ALP SERPL-CCNC: 44 U/L (ref 39–117)
ALT SERPL W P-5'-P-CCNC: 14 U/L (ref 1–33)
ANION GAP SERPL CALCULATED.3IONS-SCNC: 14.7 MMOL/L (ref 5–15)
AST SERPL-CCNC: 15 U/L (ref 1–32)
ASTRO TYP 1-8 RNA STL QL NAA+NON-PROBE: NOT DETECTED
BILIRUB SERPL-MCNC: 0.4 MG/DL (ref 0–1.2)
BUN SERPL-MCNC: 12 MG/DL (ref 6–20)
BUN/CREAT SERPL: 13.2 (ref 7–25)
C CAYETANENSIS DNA STL QL NAA+NON-PROBE: NOT DETECTED
C COLI+JEJ+UPSA DNA STL QL NAA+NON-PROBE: NOT DETECTED
C DIFF TOX GENS STL QL NAA+PROBE: DETECTED
CALCIUM SPEC-SCNC: 9.7 MG/DL (ref 8.6–10.5)
CHLORIDE SERPL-SCNC: 103 MMOL/L (ref 98–107)
CO2 SERPL-SCNC: 20.3 MMOL/L (ref 22–29)
CREAT SERPL-MCNC: 0.91 MG/DL (ref 0.57–1)
CRP SERPL-MCNC: <0.3 MG/DL (ref 0–0.5)
CRYPTOSP DNA STL QL NAA+NON-PROBE: NOT DETECTED
E HISTOLYT DNA STL QL NAA+NON-PROBE: NOT DETECTED
EAEC PAA PLAS AGGR+AATA ST NAA+NON-PRB: NOT DETECTED
EC STX1+STX2 GENES STL QL NAA+NON-PROBE: NOT DETECTED
EGFRCR SERPLBLD CKD-EPI 2021: 90 ML/MIN/1.73
ENDOMYSIUM IGA SER QL: NEGATIVE
EPEC EAE GENE STL QL NAA+NON-PROBE: NOT DETECTED
ETEC LTA+ST1A+ST1B TOX ST NAA+NON-PROBE: NOT DETECTED
G LAMBLIA DNA STL QL NAA+NON-PROBE: NOT DETECTED
GLIADIN PEPTIDE IGA SER-ACNC: 5 UNITS (ref 0–19)
GLIADIN PEPTIDE IGG SER-ACNC: 3 UNITS (ref 0–19)
GLOBULIN UR ELPH-MCNC: 2.8 GM/DL
GLUCOSE SERPL-MCNC: 72 MG/DL (ref 65–99)
IGA SERPL-MCNC: 203 MG/DL (ref 87–352)
NOROVIRUS GI+II RNA STL QL NAA+NON-PROBE: NOT DETECTED
P SHIGELLOIDES DNA STL QL NAA+NON-PROBE: NOT DETECTED
POTASSIUM SERPL-SCNC: 3.8 MMOL/L (ref 3.5–5.2)
PROT SERPL-MCNC: 6.9 G/DL (ref 6–8.5)
RVA RNA STL QL NAA+NON-PROBE: NOT DETECTED
S ENT+BONG DNA STL QL NAA+NON-PROBE: NOT DETECTED
SAPO I+II+IV+V RNA STL QL NAA+NON-PROBE: NOT DETECTED
SHIGELLA SP+EIEC IPAH ST NAA+NON-PROBE: NOT DETECTED
SODIUM SERPL-SCNC: 138 MMOL/L (ref 136–145)
TTG IGA SER-ACNC: <2 U/ML (ref 0–3)
TTG IGG SER-ACNC: 4 U/ML (ref 0–5)
V CHOL+PARA+VUL DNA STL QL NAA+NON-PROBE: NOT DETECTED
V CHOLERAE DNA STL QL NAA+NON-PROBE: NOT DETECTED
Y ENTEROCOL DNA STL QL NAA+NON-PROBE: NOT DETECTED

## 2022-09-02 PROCEDURE — 83993 ASSAY FOR CALPROTECTIN FECAL: CPT

## 2022-09-02 PROCEDURE — 87493 C DIFF AMPLIFIED PROBE: CPT

## 2022-09-02 PROCEDURE — 87507 IADNA-DNA/RNA PROBE TQ 12-25: CPT

## 2022-09-02 PROCEDURE — 87177 OVA AND PARASITES SMEARS: CPT

## 2022-09-02 PROCEDURE — 87209 SMEAR COMPLEX STAIN: CPT

## 2022-09-02 PROCEDURE — 82653 EL-1 FECAL QUANTITATIVE: CPT

## 2022-09-06 DIAGNOSIS — A04.72 COLITIS, CLOSTRIDIUM DIFFICILE: Primary | ICD-10-CM

## 2022-09-06 LAB — CALPROTECTIN STL-MCNT: 57 UG/G (ref 0–120)

## 2022-09-06 RX ORDER — VANCOMYCIN HYDROCHLORIDE 125 MG/1
125 CAPSULE ORAL 4 TIMES DAILY
Qty: 40 CAPSULE | Refills: 0 | Status: SHIPPED | OUTPATIENT
Start: 2022-09-06 | End: 2022-09-16

## 2022-09-07 LAB
O+P SPEC MICRO: NORMAL
O+P STL CONC: NORMAL

## 2022-09-08 LAB — ELASTASE PANC STL-MCNT: >500 UG ELAST./G

## 2022-09-19 ENCOUNTER — TELEPHONE (OUTPATIENT)
Dept: GASTROENTEROLOGY | Facility: CLINIC | Age: 27
End: 2022-09-19

## 2022-09-19 DIAGNOSIS — A04.72 COLITIS, CLOSTRIDIUM DIFFICILE: Primary | ICD-10-CM

## 2022-09-20 ENCOUNTER — PRIOR AUTHORIZATION (OUTPATIENT)
Dept: GASTROENTEROLOGY | Facility: CLINIC | Age: 27
End: 2022-09-20

## 2022-10-31 ENCOUNTER — OUTSIDE FACILITY SERVICE (OUTPATIENT)
Dept: GASTROENTEROLOGY | Facility: CLINIC | Age: 27
End: 2022-10-31

## 2022-10-31 PROCEDURE — 43239 EGD BIOPSY SINGLE/MULTIPLE: CPT | Performed by: INTERNAL MEDICINE

## 2022-10-31 PROCEDURE — 88305 TISSUE EXAM BY PATHOLOGIST: CPT

## 2022-11-01 ENCOUNTER — LAB REQUISITION (OUTPATIENT)
Dept: LAB | Facility: HOSPITAL | Age: 27
End: 2022-11-01

## 2022-11-01 DIAGNOSIS — K44.9 DIAPHRAGMATIC HERNIA WITHOUT OBSTRUCTION OR GANGRENE: ICD-10-CM

## 2022-11-01 DIAGNOSIS — R10.12 LEFT UPPER QUADRANT PAIN: ICD-10-CM

## 2022-11-01 DIAGNOSIS — K21.00 GASTRO-ESOPHAGEAL REFLUX DISEASE WITH ESOPHAGITIS, WITHOUT BLEEDING: ICD-10-CM

## 2022-11-02 LAB — REF LAB TEST METHOD: NORMAL

## 2022-11-11 RX ORDER — PANTOPRAZOLE SODIUM 40 MG/1
40 TABLET, DELAYED RELEASE ORAL DAILY
Qty: 90 TABLET | Refills: 3 | OUTPATIENT
Start: 2022-11-11 | End: 2022-12-04